# Patient Record
Sex: FEMALE | Race: WHITE | NOT HISPANIC OR LATINO | URBAN - METROPOLITAN AREA
[De-identification: names, ages, dates, MRNs, and addresses within clinical notes are randomized per-mention and may not be internally consistent; named-entity substitution may affect disease eponyms.]

---

## 2017-07-23 ENCOUNTER — COMMUNICATION - HEALTHEAST (OUTPATIENT)
Dept: INTERNAL MEDICINE | Facility: CLINIC | Age: 47
End: 2017-07-23

## 2017-09-05 ENCOUNTER — HOSPITAL ENCOUNTER (OUTPATIENT)
Dept: MAMMOGRAPHY | Facility: HOSPITAL | Age: 47
Discharge: HOME | End: 2017-09-05
Attending: INTERNAL MEDICINE

## 2017-09-05 DIAGNOSIS — Z12.31 VISIT FOR SCREENING MAMMOGRAM: ICD-10-CM

## 2017-09-25 ENCOUNTER — COMMUNICATION - HEALTHEAST (OUTPATIENT)
Dept: INTERNAL MEDICINE | Facility: CLINIC | Age: 47
End: 2017-09-25

## 2017-09-25 ENCOUNTER — OFFICE VISIT - HEALTHEAST (OUTPATIENT)
Dept: INTERNAL MEDICINE | Facility: CLINIC | Age: 47
End: 2017-09-25

## 2017-09-25 DIAGNOSIS — Z00.00 ROUTINE PHYSICAL EXAMINATION: ICD-10-CM

## 2017-09-25 DIAGNOSIS — E78.5 HLD (HYPERLIPIDEMIA): ICD-10-CM

## 2017-09-25 DIAGNOSIS — Z78.0 MENOPAUSE: ICD-10-CM

## 2017-09-25 DIAGNOSIS — J30.9 ALLERGIC RHINITIS: ICD-10-CM

## 2017-09-25 DIAGNOSIS — K21.9 GERD (GASTROESOPHAGEAL REFLUX DISEASE): ICD-10-CM

## 2017-09-25 DIAGNOSIS — I10 HTN (HYPERTENSION): ICD-10-CM

## 2017-09-25 DIAGNOSIS — L91.8 SKIN TAG: ICD-10-CM

## 2017-09-25 LAB
CHOLEST SERPL-MCNC: 205 MG/DL
FASTING STATUS PATIENT QL REPORTED: YES
HDLC SERPL-MCNC: 56 MG/DL
LDLC SERPL CALC-MCNC: 116 MG/DL
TRIGL SERPL-MCNC: 166 MG/DL

## 2017-09-25 ASSESSMENT — MIFFLIN-ST. JEOR: SCORE: 1381.98

## 2017-11-06 ENCOUNTER — COMMUNICATION - HEALTHEAST (OUTPATIENT)
Dept: INTERNAL MEDICINE | Facility: CLINIC | Age: 47
End: 2017-11-06

## 2017-11-06 DIAGNOSIS — E78.5 HLD (HYPERLIPIDEMIA): ICD-10-CM

## 2018-08-12 ENCOUNTER — OFFICE VISIT - HEALTHEAST (OUTPATIENT)
Dept: FAMILY MEDICINE | Facility: CLINIC | Age: 48
End: 2018-08-12

## 2018-08-12 DIAGNOSIS — S82.62XA CLOSED DISPLACED FRACTURE OF LATERAL MALLEOLUS OF LEFT FIBULA, INITIAL ENCOUNTER: ICD-10-CM

## 2018-08-12 DIAGNOSIS — M25.572 LEFT ANKLE PAIN: ICD-10-CM

## 2018-08-17 ENCOUNTER — RECORDS - HEALTHEAST (OUTPATIENT)
Dept: ADMINISTRATIVE | Facility: OTHER | Age: 48
End: 2018-08-17

## 2018-08-25 ENCOUNTER — COMMUNICATION - HEALTHEAST (OUTPATIENT)
Dept: INTERNAL MEDICINE | Facility: CLINIC | Age: 48
End: 2018-08-25

## 2018-08-31 ENCOUNTER — RECORDS - HEALTHEAST (OUTPATIENT)
Dept: ADMINISTRATIVE | Facility: OTHER | Age: 48
End: 2018-08-31

## 2018-09-24 ENCOUNTER — HOSPITAL ENCOUNTER (OUTPATIENT)
Dept: MAMMOGRAPHY | Facility: CLINIC | Age: 48
Discharge: HOME | End: 2018-09-24
Attending: INTERNAL MEDICINE

## 2018-09-24 DIAGNOSIS — Z12.31 VISIT FOR SCREENING MAMMOGRAM: ICD-10-CM

## 2018-11-07 ENCOUNTER — OFFICE VISIT - HEALTHEAST (OUTPATIENT)
Dept: FAMILY MEDICINE | Facility: CLINIC | Age: 48
End: 2018-11-07

## 2018-11-07 DIAGNOSIS — D72.829 LEUKOCYTOSIS: ICD-10-CM

## 2018-11-07 DIAGNOSIS — N17.9 ACUTE RENAL FAILURE (H): ICD-10-CM

## 2018-11-07 DIAGNOSIS — E87.6 HYPOKALEMIA: ICD-10-CM

## 2018-11-07 DIAGNOSIS — K52.9 GASTROENTERITIS: ICD-10-CM

## 2018-11-07 DIAGNOSIS — B37.0 ORAL THRUSH: ICD-10-CM

## 2018-11-07 LAB
ANION GAP SERPL CALCULATED.3IONS-SCNC: 15 MMOL/L (ref 5–18)
BASOPHILS # BLD AUTO: 0.1 THOU/UL (ref 0–0.2)
BASOPHILS NFR BLD AUTO: 0 % (ref 0–2)
BUN SERPL-MCNC: 38 MG/DL (ref 8–22)
CHLORIDE BLD-SCNC: 96 MMOL/L (ref 98–107)
CO2 SERPL-SCNC: 26 MMOL/L (ref 22–31)
CREAT SERPL-MCNC: 2.4 MG/DL (ref 0.7–1.3)
EOSINOPHIL # BLD AUTO: 0.2 THOU/UL (ref 0–0.4)
EOSINOPHIL NFR BLD AUTO: 1 % (ref 0–6)
ERYTHROCYTE [DISTWIDTH] IN BLOOD BY AUTOMATED COUNT: 13.6 % (ref 11–14.5)
GLUCOSE BLD-MCNC: 118 MG/DL (ref 70–125)
HCT VFR BLD AUTO: 36.4 % (ref 35–47)
HGB BLD-MCNC: 12.1 G/DL (ref 12–16)
LYMPHOCYTES # BLD AUTO: 0.8 THOU/UL (ref 0.8–4.4)
LYMPHOCYTES NFR BLD AUTO: 5 % (ref 20–40)
MCH RBC QN AUTO: 28.4 PG (ref 27–34)
MCHC RBC AUTO-ENTMCNC: 33.2 G/DL (ref 32–36)
MCV RBC AUTO: 85 FL (ref 80–100)
MONOCYTES # BLD AUTO: 1.4 THOU/UL (ref 0–0.9)
MONOCYTES NFR BLD AUTO: 8 % (ref 2–10)
NEUTROPHILS # BLD AUTO: 15.1 THOU/UL (ref 2–7.7)
NEUTROPHILS NFR BLD AUTO: 86 % (ref 50–70)
PLATELET # BLD AUTO: 381 THOU/UL (ref 140–440)
PMV BLD AUTO: 9.9 FL (ref 8.5–12.5)
POTASSIUM BLD-SCNC: 3.1 MMOL/L (ref 3.5–5.5)
RBC # BLD AUTO: 4.26 MILL/UL (ref 3.8–5.4)
SODIUM SERPL-SCNC: 137 MMOL/L (ref 136–145)
WBC: 17.7 THOU/UL (ref 4–11)

## 2018-11-07 ASSESSMENT — MIFFLIN-ST. JEOR
SCORE: 1371.55
SCORE: 1371.55

## 2018-11-08 ASSESSMENT — MIFFLIN-ST. JEOR
SCORE: 1400.12
SCORE: 1400.12

## 2018-11-09 ASSESSMENT — MIFFLIN-ST. JEOR
SCORE: 1384.25
SCORE: 1384.25

## 2018-11-13 ASSESSMENT — MIFFLIN-ST. JEOR
SCORE: 1399.67
SCORE: 1399.67

## 2018-11-14 ENCOUNTER — SURGERY - HEALTHEAST (OUTPATIENT)
Dept: GASTROENTEROLOGY | Facility: HOSPITAL | Age: 48
End: 2018-11-14

## 2018-11-15 ENCOUNTER — SURGERY - HEALTHEAST (OUTPATIENT)
Dept: GASTROENTEROLOGY | Facility: HOSPITAL | Age: 48
End: 2018-11-15

## 2018-11-15 ENCOUNTER — RECORDS - HEALTHEAST (OUTPATIENT)
Dept: ADMINISTRATIVE | Facility: OTHER | Age: 48
End: 2018-11-15

## 2018-11-15 ASSESSMENT — MIFFLIN-ST. JEOR
SCORE: 1401.03
SCORE: 1401.03

## 2018-11-18 ASSESSMENT — MIFFLIN-ST. JEOR
SCORE: 1405.57
SCORE: 1405.57

## 2018-11-19 ASSESSMENT — MIFFLIN-ST. JEOR
SCORE: 1397.4
SCORE: 1397.4

## 2018-11-20 ASSESSMENT — MIFFLIN-ST. JEOR
SCORE: 1394.68
SCORE: 1394.68

## 2018-11-22 ENCOUNTER — RECORDS - HEALTHEAST (OUTPATIENT)
Dept: ADMINISTRATIVE | Facility: OTHER | Age: 48
End: 2018-11-22

## 2018-11-26 ENCOUNTER — COMMUNICATION - HEALTHEAST (OUTPATIENT)
Dept: CARE COORDINATION | Facility: CLINIC | Age: 48
End: 2018-11-26

## 2018-11-27 ENCOUNTER — RECORDS - HEALTHEAST (OUTPATIENT)
Dept: ADMINISTRATIVE | Facility: OTHER | Age: 48
End: 2018-11-27

## 2018-11-30 ENCOUNTER — AMBULATORY - HEALTHEAST (OUTPATIENT)
Dept: INFUSION THERAPY | Facility: HOSPITAL | Age: 48
End: 2018-11-30

## 2018-11-30 DIAGNOSIS — I77.82 ANCA-ASSOCIATED VASCULITIS (H): ICD-10-CM

## 2018-12-04 ENCOUNTER — OFFICE VISIT - HEALTHEAST (OUTPATIENT)
Dept: INTERNAL MEDICINE | Facility: CLINIC | Age: 48
End: 2018-12-04

## 2018-12-04 DIAGNOSIS — Z09 HOSPITAL DISCHARGE FOLLOW-UP: ICD-10-CM

## 2018-12-04 DIAGNOSIS — E78.2 MIXED HYPERLIPIDEMIA: ICD-10-CM

## 2018-12-04 DIAGNOSIS — D64.9 ANEMIA, UNSPECIFIED TYPE: ICD-10-CM

## 2018-12-04 DIAGNOSIS — N17.0 ACUTE KIDNEY FAILURE WITH LESION OF TUBULAR NECROSIS (H): ICD-10-CM

## 2018-12-04 DIAGNOSIS — I77.82 ANCA-ASSOCIATED VASCULITIS (H): ICD-10-CM

## 2018-12-04 DIAGNOSIS — J30.9 ALLERGIC RHINITIS, UNSPECIFIED SEASONALITY, UNSPECIFIED TRIGGER: ICD-10-CM

## 2018-12-04 DIAGNOSIS — Z02.89 ENCOUNTER FOR COMPLETION OF FORM WITH PATIENT: ICD-10-CM

## 2018-12-04 DIAGNOSIS — E87.20 METABOLIC ACIDOSIS, NORMAL ANION GAP (NAG): ICD-10-CM

## 2018-12-04 DIAGNOSIS — I10 ESSENTIAL HYPERTENSION: ICD-10-CM

## 2018-12-04 DIAGNOSIS — M31.31 GRANULOMATOSIS WITH POLYANGIITIS WITH RENAL INVOLVEMENT (H): ICD-10-CM

## 2018-12-04 DIAGNOSIS — K52.9 ILEITIS: ICD-10-CM

## 2018-12-04 DIAGNOSIS — K92.1 MELENA: ICD-10-CM

## 2018-12-04 DIAGNOSIS — K21.9 GASTROESOPHAGEAL REFLUX DISEASE, ESOPHAGITIS PRESENCE NOT SPECIFIED: ICD-10-CM

## 2018-12-06 ENCOUNTER — INFUSION - HEALTHEAST (OUTPATIENT)
Dept: INFUSION THERAPY | Facility: CLINIC | Age: 48
End: 2018-12-06

## 2018-12-06 DIAGNOSIS — N17.0 ACUTE KIDNEY FAILURE WITH LESION OF TUBULAR NECROSIS (H): ICD-10-CM

## 2018-12-06 LAB
ALBUMIN SERPL-MCNC: 2.6 G/DL (ref 3.5–5)
ANION GAP SERPL CALCULATED.3IONS-SCNC: 13 MMOL/L (ref 5–18)
BASOPHILS # BLD AUTO: 0.1 THOU/UL (ref 0–0.2)
BASOPHILS NFR BLD AUTO: 1 % (ref 0–2)
BUN SERPL-MCNC: 124 MG/DL (ref 8–22)
CALCIUM SERPL-MCNC: 7 MG/DL (ref 8.5–10.5)
CHLORIDE BLD-SCNC: 105 MMOL/L (ref 98–107)
CO2 SERPL-SCNC: 26 MMOL/L (ref 22–31)
CREAT SERPL-MCNC: 6.87 MG/DL (ref 0.6–1.1)
EOSINOPHIL # BLD AUTO: 0.5 THOU/UL (ref 0–0.4)
EOSINOPHIL NFR BLD AUTO: 3 % (ref 0–6)
ERYTHROCYTE [DISTWIDTH] IN BLOOD BY AUTOMATED COUNT: 17.5 % (ref 11–14.5)
GFR SERPL CREATININE-BSD FRML MDRD: 6 ML/MIN/1.73M2
GLUCOSE BLD-MCNC: 112 MG/DL (ref 70–125)
HCT VFR BLD AUTO: 27.1 % (ref 35–47)
HGB BLD-MCNC: 8.6 G/DL (ref 12–16)
LYMPHOCYTES # BLD AUTO: 0.5 THOU/UL (ref 0.8–4.4)
LYMPHOCYTES NFR BLD AUTO: 4 % (ref 20–40)
MCH RBC QN AUTO: 28.2 PG (ref 27–34)
MCHC RBC AUTO-ENTMCNC: 31.7 G/DL (ref 32–36)
MCV RBC AUTO: 89 FL (ref 80–100)
MONOCYTES # BLD AUTO: 0.9 THOU/UL (ref 0–0.9)
MONOCYTES NFR BLD AUTO: 6 % (ref 2–10)
NEUTROPHILS # BLD AUTO: 12 THOU/UL (ref 2–7.7)
NEUTROPHILS NFR BLD AUTO: 87 % (ref 50–70)
PHOSPHATE SERPL-MCNC: 5 MG/DL (ref 2.5–4.5)
PLATELET # BLD AUTO: 200 THOU/UL (ref 140–440)
PMV BLD AUTO: 9.8 FL (ref 8.5–12.5)
POTASSIUM BLD-SCNC: 4.7 MMOL/L (ref 3.5–5)
RBC # BLD AUTO: 3.05 MILL/UL (ref 3.8–5.4)
SODIUM SERPL-SCNC: 144 MMOL/L (ref 136–145)
WBC: 14.2 THOU/UL (ref 4–11)

## 2018-12-11 ENCOUNTER — COMMUNICATION - HEALTHEAST (OUTPATIENT)
Dept: INTERNAL MEDICINE | Facility: CLINIC | Age: 48
End: 2018-12-11

## 2018-12-20 ENCOUNTER — HOSPITAL ENCOUNTER (OUTPATIENT)
Dept: ULTRASOUND IMAGING | Facility: HOSPITAL | Age: 48
Discharge: HOME | End: 2018-12-20
Attending: INTERNAL MEDICINE

## 2018-12-20 ENCOUNTER — INFUSION - HEALTHEAST (OUTPATIENT)
Dept: INFUSION THERAPY | Facility: HOSPITAL | Age: 48
End: 2018-12-20

## 2018-12-20 DIAGNOSIS — R60.0 LEG EDEMA, RIGHT: ICD-10-CM

## 2018-12-20 DIAGNOSIS — N17.0 ACUTE KIDNEY FAILURE WITH LESION OF TUBULAR NECROSIS (H): ICD-10-CM

## 2018-12-20 DIAGNOSIS — I77.82 ANCA-ASSOCIATED VASCULITIS (H): ICD-10-CM

## 2018-12-20 LAB
ALBUMIN SERPL-MCNC: 2.4 G/DL (ref 3.5–5)
ANION GAP SERPL CALCULATED.3IONS-SCNC: 16 MMOL/L (ref 5–18)
BASOPHILS # BLD AUTO: 0 THOU/UL (ref 0–0.2)
BASOPHILS NFR BLD AUTO: 0 % (ref 0–2)
BUN SERPL-MCNC: 90 MG/DL (ref 8–22)
CALCIUM SERPL-MCNC: 6.5 MG/DL (ref 8.5–10.5)
CHLORIDE BLD-SCNC: 102 MMOL/L (ref 98–107)
CO2 SERPL-SCNC: 27 MMOL/L (ref 22–31)
CREAT SERPL-MCNC: 4.54 MG/DL (ref 0.6–1.1)
EOSINOPHIL # BLD AUTO: 0.3 THOU/UL (ref 0–0.4)
EOSINOPHIL NFR BLD AUTO: 3 % (ref 0–6)
ERYTHROCYTE [DISTWIDTH] IN BLOOD BY AUTOMATED COUNT: 19.9 % (ref 11–14.5)
GFR SERPL CREATININE-BSD FRML MDRD: 10 ML/MIN/1.73M2
GLUCOSE BLD-MCNC: 106 MG/DL (ref 70–125)
HCT VFR BLD AUTO: 26.9 % (ref 35–47)
HGB BLD-MCNC: 8.5 G/DL (ref 12–16)
LYMPHOCYTES # BLD AUTO: 0.6 THOU/UL (ref 0.8–4.4)
LYMPHOCYTES NFR BLD AUTO: 7 % (ref 20–40)
MCH RBC QN AUTO: 28.5 PG (ref 27–34)
MCHC RBC AUTO-ENTMCNC: 31.6 G/DL (ref 32–36)
MCV RBC AUTO: 90 FL (ref 80–100)
MONOCYTES # BLD AUTO: 0.7 THOU/UL (ref 0–0.9)
MONOCYTES NFR BLD AUTO: 9 % (ref 2–10)
NEUTROPHILS # BLD AUTO: 6.4 THOU/UL (ref 2–7.7)
NEUTROPHILS NFR BLD AUTO: 80 % (ref 50–70)
PHOSPHATE SERPL-MCNC: 5.6 MG/DL (ref 2.5–4.5)
PLATELET # BLD AUTO: 202 THOU/UL (ref 140–440)
PMV BLD AUTO: 10.7 FL (ref 8.5–12.5)
POTASSIUM BLD-SCNC: 3.4 MMOL/L (ref 3.5–5)
RBC # BLD AUTO: 2.98 MILL/UL (ref 3.8–5.4)
SODIUM SERPL-SCNC: 145 MMOL/L (ref 136–145)
WBC: 8 THOU/UL (ref 4–11)

## 2018-12-27 ENCOUNTER — COMMUNICATION - HEALTHEAST (OUTPATIENT)
Dept: INTERNAL MEDICINE | Facility: CLINIC | Age: 48
End: 2018-12-27

## 2019-01-03 ENCOUNTER — INFUSION - HEALTHEAST (OUTPATIENT)
Dept: INFUSION THERAPY | Facility: HOSPITAL | Age: 49
End: 2019-01-03

## 2019-01-03 ENCOUNTER — OFFICE VISIT - HEALTHEAST (OUTPATIENT)
Dept: INTERNAL MEDICINE | Facility: CLINIC | Age: 49
End: 2019-01-03

## 2019-01-03 DIAGNOSIS — M25.561 ACUTE PAIN OF RIGHT KNEE: ICD-10-CM

## 2019-01-03 DIAGNOSIS — Z76.89 RETURN TO WORK EXAM: ICD-10-CM

## 2019-01-03 DIAGNOSIS — N17.0 ACUTE KIDNEY FAILURE WITH LESION OF TUBULAR NECROSIS (H): ICD-10-CM

## 2019-01-03 DIAGNOSIS — I10 ESSENTIAL HYPERTENSION: ICD-10-CM

## 2019-01-03 DIAGNOSIS — R60.0 BILATERAL LOWER EXTREMITY EDEMA: ICD-10-CM

## 2019-01-03 DIAGNOSIS — M31.31 GRANULOMATOSIS WITH POLYANGIITIS WITH RENAL INVOLVEMENT (H): ICD-10-CM

## 2019-01-03 DIAGNOSIS — M71.21 BAKER'S CYST OF KNEE, RIGHT: ICD-10-CM

## 2019-01-03 DIAGNOSIS — Z29.89 NEED FOR PNEUMOCYSTIS PROPHYLAXIS: ICD-10-CM

## 2019-01-03 LAB
ALBUMIN SERPL-MCNC: 2.5 G/DL (ref 3.5–5)
ANION GAP SERPL CALCULATED.3IONS-SCNC: 13 MMOL/L (ref 5–18)
BASOPHILS # BLD AUTO: 0 THOU/UL (ref 0–0.2)
BASOPHILS NFR BLD AUTO: 0 % (ref 0–2)
BUN SERPL-MCNC: 89 MG/DL (ref 8–22)
CALCIUM SERPL-MCNC: 7.7 MG/DL (ref 8.5–10.5)
CHLORIDE BLD-SCNC: 105 MMOL/L (ref 98–107)
CO2 SERPL-SCNC: 25 MMOL/L (ref 22–31)
CREAT SERPL-MCNC: 3.71 MG/DL (ref 0.6–1.1)
EOSINOPHIL # BLD AUTO: 0.1 THOU/UL (ref 0–0.4)
EOSINOPHIL NFR BLD AUTO: 1 % (ref 0–6)
ERYTHROCYTE [DISTWIDTH] IN BLOOD BY AUTOMATED COUNT: 22 % (ref 11–14.5)
GFR SERPL CREATININE-BSD FRML MDRD: 13 ML/MIN/1.73M2
GLUCOSE BLD-MCNC: 115 MG/DL (ref 70–125)
HCT VFR BLD AUTO: 29.1 % (ref 35–47)
HGB BLD-MCNC: 8.9 G/DL (ref 12–16)
LYMPHOCYTES # BLD AUTO: 1.1 THOU/UL (ref 0.8–4.4)
LYMPHOCYTES NFR BLD AUTO: 8 % (ref 20–40)
MCH RBC QN AUTO: 29 PG (ref 27–34)
MCHC RBC AUTO-ENTMCNC: 30.6 G/DL (ref 32–36)
MCV RBC AUTO: 95 FL (ref 80–100)
MONOCYTES # BLD AUTO: 0.7 THOU/UL (ref 0–0.9)
MONOCYTES NFR BLD AUTO: 6 % (ref 2–10)
NEUTROPHILS # BLD AUTO: 10.7 THOU/UL (ref 2–7.7)
NEUTROPHILS NFR BLD AUTO: 86 % (ref 50–70)
OVALOCYTES: ABNORMAL
PHOSPHATE SERPL-MCNC: 4.4 MG/DL (ref 2.5–4.5)
PLAT MORPH BLD: NORMAL
PLATELET # BLD AUTO: 236 THOU/UL (ref 140–440)
PMV BLD AUTO: 11.1 FL (ref 8.5–12.5)
POLYCHROMASIA BLD QL SMEAR: ABNORMAL
POTASSIUM BLD-SCNC: 3.7 MMOL/L (ref 3.5–5)
RBC # BLD AUTO: 3.07 MILL/UL (ref 3.8–5.4)
SODIUM SERPL-SCNC: 143 MMOL/L (ref 136–145)
WBC: 12.9 THOU/UL (ref 4–11)

## 2019-01-28 ENCOUNTER — RECORDS - HEALTHEAST (OUTPATIENT)
Dept: ADMINISTRATIVE | Facility: OTHER | Age: 49
End: 2019-01-28

## 2019-02-05 ENCOUNTER — RECORDS - HEALTHEAST (OUTPATIENT)
Dept: ADMINISTRATIVE | Facility: OTHER | Age: 49
End: 2019-02-05

## 2019-02-18 ENCOUNTER — RECORDS - HEALTHEAST (OUTPATIENT)
Dept: ADMINISTRATIVE | Facility: OTHER | Age: 49
End: 2019-02-18

## 2019-02-25 ENCOUNTER — RECORDS - HEALTHEAST (OUTPATIENT)
Dept: ADMINISTRATIVE | Facility: OTHER | Age: 49
End: 2019-02-25

## 2019-03-04 ENCOUNTER — OFFICE VISIT - HEALTHEAST (OUTPATIENT)
Dept: INTERNAL MEDICINE | Facility: CLINIC | Age: 49
End: 2019-03-04

## 2019-03-04 DIAGNOSIS — I10 ESSENTIAL HYPERTENSION: ICD-10-CM

## 2019-03-04 DIAGNOSIS — M31.31 GRANULOMATOSIS WITH POLYANGIITIS WITH RENAL INVOLVEMENT (H): ICD-10-CM

## 2019-03-04 DIAGNOSIS — J31.0 CHRONIC RHINITIS: ICD-10-CM

## 2019-03-04 DIAGNOSIS — R11.0 NAUSEA: ICD-10-CM

## 2019-03-04 DIAGNOSIS — K21.9 GASTROESOPHAGEAL REFLUX DISEASE, ESOPHAGITIS PRESENCE NOT SPECIFIED: ICD-10-CM

## 2019-03-04 DIAGNOSIS — N17.9 ACUTE KIDNEY INJURY (H): ICD-10-CM

## 2019-03-04 DIAGNOSIS — D63.1 ANEMIA OF RENAL DISEASE: ICD-10-CM

## 2019-03-04 DIAGNOSIS — N18.9 ANEMIA OF RENAL DISEASE: ICD-10-CM

## 2019-03-04 DIAGNOSIS — N17.0 ACUTE KIDNEY FAILURE WITH LESION OF TUBULAR NECROSIS (H): ICD-10-CM

## 2019-03-04 DIAGNOSIS — E78.2 MIXED HYPERLIPIDEMIA: ICD-10-CM

## 2019-03-04 RX ORDER — TORSEMIDE 20 MG/1
20 TABLET ORAL EVERY OTHER DAY
Status: SHIPPED
Start: 2019-03-04

## 2019-03-04 RX ORDER — CALCITRIOL 0.25 UG/1
0.25 CAPSULE, LIQUID FILLED ORAL DAILY
Refills: 11 | Status: SHIPPED
Start: 2019-03-04

## 2019-03-04 RX ORDER — AZELASTINE 1 MG/ML
1 SPRAY, METERED NASAL EVERY MORNING
Qty: 30 ML | Refills: 11 | Status: SHIPPED | OUTPATIENT
Start: 2019-03-04

## 2019-03-05 ENCOUNTER — RECORDS - HEALTHEAST (OUTPATIENT)
Dept: ADMINISTRATIVE | Facility: OTHER | Age: 49
End: 2019-03-05

## 2019-04-17 ENCOUNTER — RECORDS - HEALTHEAST (OUTPATIENT)
Dept: ADMINISTRATIVE | Facility: OTHER | Age: 49
End: 2019-04-17

## 2019-06-24 ENCOUNTER — RECORDS - HEALTHEAST (OUTPATIENT)
Dept: ADMINISTRATIVE | Facility: OTHER | Age: 49
End: 2019-06-24

## 2019-07-15 ENCOUNTER — OFFICE VISIT - HEALTHEAST (OUTPATIENT)
Dept: INTERNAL MEDICINE | Facility: CLINIC | Age: 49
End: 2019-07-15

## 2019-07-15 DIAGNOSIS — N18.9 ANEMIA OF RENAL DISEASE: ICD-10-CM

## 2019-07-15 DIAGNOSIS — R09.82 POST-NASAL DRIP: ICD-10-CM

## 2019-07-15 DIAGNOSIS — R11.0 NAUSEA: ICD-10-CM

## 2019-07-15 DIAGNOSIS — Z01.818 PRE-TRANSPLANT EVALUATION FOR CKD (CHRONIC KIDNEY DISEASE): ICD-10-CM

## 2019-07-15 DIAGNOSIS — D63.1 ANEMIA OF RENAL DISEASE: ICD-10-CM

## 2019-07-15 DIAGNOSIS — I10 ESSENTIAL HYPERTENSION: ICD-10-CM

## 2019-07-15 DIAGNOSIS — N18.5 CKD (CHRONIC KIDNEY DISEASE) STAGE 5, GFR LESS THAN 15 ML/MIN (H): ICD-10-CM

## 2019-07-15 DIAGNOSIS — M31.31 GRANULOMATOSIS WITH POLYANGIITIS WITH RENAL INVOLVEMENT (H): ICD-10-CM

## 2019-07-15 DIAGNOSIS — Z02.89 ENCOUNTER FOR COMPLETION OF FORM WITH PATIENT: ICD-10-CM

## 2019-07-15 RX ORDER — LOSARTAN POTASSIUM 50 MG/1
50 TABLET ORAL DAILY
Refills: 11 | Status: SHIPPED | COMMUNITY
Start: 2019-06-23

## 2019-07-16 RX ORDER — CYCLOPHOSPHAMIDE 25 MG/1
50 CAPSULE ORAL DAILY
Refills: 8 | Status: SHIPPED | OUTPATIENT
Start: 2019-07-16

## 2019-08-14 ENCOUNTER — AMBULATORY - HEALTHEAST (OUTPATIENT)
Dept: MULTI SPECIALTY CLINIC | Facility: CLINIC | Age: 49
End: 2019-08-14

## 2019-09-10 ENCOUNTER — AMBULATORY - HEALTHEAST (OUTPATIENT)
Dept: MULTI SPECIALTY CLINIC | Facility: CLINIC | Age: 49
End: 2019-09-10

## 2019-09-10 LAB
HPV_EXT - HISTORICAL: NORMAL
PAP SMEAR - HIM PATIENT REPORTED: NORMAL

## 2019-10-04 ENCOUNTER — RECORDS - HEALTHEAST (OUTPATIENT)
Dept: ADMINISTRATIVE | Facility: OTHER | Age: 49
End: 2019-10-04

## 2019-11-19 ENCOUNTER — COMMUNICATION - HEALTHEAST (OUTPATIENT)
Dept: INTERNAL MEDICINE | Facility: CLINIC | Age: 49
End: 2019-11-19

## 2020-04-28 ENCOUNTER — RECORDS - HEALTHEAST (OUTPATIENT)
Dept: ADMINISTRATIVE | Facility: OTHER | Age: 50
End: 2020-04-28

## 2020-05-07 ENCOUNTER — RECORDS - HEALTHEAST (OUTPATIENT)
Dept: ADMINISTRATIVE | Facility: OTHER | Age: 50
End: 2020-05-07

## 2020-07-03 ENCOUNTER — COMMUNICATION - HEALTHEAST (OUTPATIENT)
Dept: INTERNAL MEDICINE | Facility: CLINIC | Age: 50
End: 2020-07-03

## 2020-07-03 RX ORDER — MYCOPHENOLATE MOFETIL 250 MG/1
750 CAPSULE ORAL 2 TIMES DAILY
Status: SHIPPED | COMMUNITY
Start: 2020-05-14

## 2020-07-03 RX ORDER — LORATADINE 10 MG/1
10 TABLET ORAL DAILY
Status: SHIPPED | COMMUNITY
Start: 2020-05-14

## 2020-07-03 RX ORDER — CARVEDILOL 12.5 MG/1
12.5 TABLET ORAL 2 TIMES DAILY
Status: SHIPPED | COMMUNITY
Start: 2020-05-14

## 2020-07-03 RX ORDER — ATORVASTATIN CALCIUM 10 MG/1
20 TABLET, FILM COATED ORAL DAILY
Status: SHIPPED | COMMUNITY
Start: 2020-06-18

## 2020-07-03 RX ORDER — DAPSONE 25 MG/1
50 TABLET ORAL DAILY
Status: SHIPPED | COMMUNITY
Start: 2020-06-30

## 2020-07-03 RX ORDER — TACROLIMUS 1 MG/1
3 CAPSULE ORAL 2 TIMES DAILY
Status: SHIPPED | COMMUNITY
Start: 2020-06-01

## 2020-07-03 RX ORDER — FERROUS SULFATE 325(65) MG
325 TABLET ORAL 2 TIMES DAILY
Status: SHIPPED | COMMUNITY
Start: 2020-06-18

## 2020-07-03 RX ORDER — ACYCLOVIR 200 MG/1
400 CAPSULE ORAL 2 TIMES DAILY
Status: SHIPPED | COMMUNITY
Start: 2020-05-14

## 2020-07-03 RX ORDER — ASPIRIN 81 MG/1
81 TABLET, CHEWABLE ORAL DAILY
Status: SHIPPED | COMMUNITY
Start: 2020-05-15

## 2020-07-03 RX ORDER — PREDNISONE 5 MG/1
10 TABLET ORAL DAILY
Status: SHIPPED | COMMUNITY
Start: 2020-06-22

## 2020-07-03 RX ORDER — AMLODIPINE BESYLATE 5 MG/1
5 TABLET ORAL DAILY
Status: SHIPPED | COMMUNITY
Start: 2020-05-28

## 2021-05-27 VITALS — SYSTOLIC BLOOD PRESSURE: 132 MMHG | DIASTOLIC BLOOD PRESSURE: 83 MMHG

## 2021-05-30 NOTE — PROGRESS NOTES
Wt Readings from Last 20 Encounters:   07/15/19 150 lb (68 kg)   03/04/19 150 lb (68 kg)   01/03/19 169 lb (76.7 kg)   12/20/18 166 lb (75.3 kg)   12/04/18 168 lb (76.2 kg)   11/20/18 177 lb 6.4 oz (80.5 kg)   11/07/18 166 lb 3.2 oz (75.4 kg)   08/12/18 177 lb 4.8 oz (80.4 kg)   09/25/17 174 lb 9.6 oz (79.2 kg)   09/01/16 178 lb 3.2 oz (80.8 kg)   10/19/15 171 lb (77.6 kg)   09/08/15 172 lb (78 kg)   09/03/15 170 lb 6.4 oz (77.3 kg)   09/01/15 170 lb (77.1 kg)   12/29/14 177 lb (80.3 kg)

## 2021-05-30 NOTE — PATIENT INSTRUCTIONS - HE
Please follow up if you have any further issues.    You may contact me by phone or [employer] if you are worsening or if things are not improving.    Thank you for coming in today.

## 2021-05-31 VITALS — HEIGHT: 64 IN | WEIGHT: 174.6 LBS | BODY MASS INDEX: 29.81 KG/M2

## 2021-06-01 VITALS — WEIGHT: 177.3 LBS | BODY MASS INDEX: 30.43 KG/M2

## 2021-06-02 VITALS
WEIGHT: 177.4 LBS | HEIGHT: 64 IN | BODY MASS INDEX: 30.29 KG/M2 | BODY MASS INDEX: 30.29 KG/M2 | HEIGHT: 64 IN | WEIGHT: 177.4 LBS

## 2021-06-02 VITALS — WEIGHT: 169 LBS | BODY MASS INDEX: 29.01 KG/M2

## 2021-06-02 VITALS — BODY MASS INDEX: 25.75 KG/M2 | WEIGHT: 150 LBS

## 2021-06-02 VITALS — BODY MASS INDEX: 28.84 KG/M2 | WEIGHT: 168 LBS

## 2021-06-02 VITALS — WEIGHT: 166.2 LBS | BODY MASS INDEX: 28.53 KG/M2

## 2021-06-02 VITALS — WEIGHT: 166 LBS | BODY MASS INDEX: 28.49 KG/M2

## 2021-06-03 ENCOUNTER — RECORDS - HEALTHEAST (OUTPATIENT)
Dept: ADMINISTRATIVE | Facility: CLINIC | Age: 51
End: 2021-06-03

## 2021-06-03 VITALS — WEIGHT: 150 LBS | BODY MASS INDEX: 25.75 KG/M2

## 2021-06-09 NOTE — TELEPHONE ENCOUNTER
Outside blood pressure entered and in goal.    Recently had renal transplant from her .    Lauri Wei MD  General Internal Medicine  [facility]  7/3/2020, 11:57 PM

## 2021-06-16 PROBLEM — Z94.0 IMMUNOSUPPRESSIVE MANAGEMENT ENCOUNTER FOLLOWING KIDNEY TRANSPLANT: Status: ACTIVE | Noted: 2020-05-15

## 2021-06-16 PROBLEM — N18.5 ANEMIA OF CHRONIC KIDNEY FAILURE, STAGE 5 (H): Status: ACTIVE | Noted: 2020-07-03

## 2021-06-16 PROBLEM — M31.31 GRANULOMATOSIS WITH POLYANGIITIS WITH RENAL INVOLVEMENT (H): Status: ACTIVE | Noted: 2018-12-06

## 2021-06-16 PROBLEM — Z79.899 IMMUNOSUPPRESSIVE MANAGEMENT ENCOUNTER FOLLOWING KIDNEY TRANSPLANT: Status: ACTIVE | Noted: 2020-05-15

## 2021-06-16 PROBLEM — N18.9 ANEMIA OF RENAL DISEASE: Status: ACTIVE | Noted: 2018-11-07

## 2021-06-16 PROBLEM — Q21.12 PFO (PATENT FORAMEN OVALE): Status: ACTIVE | Noted: 2020-07-03

## 2021-06-16 PROBLEM — D63.1 ANEMIA OF RENAL DISEASE: Status: ACTIVE | Noted: 2018-11-07

## 2021-06-16 PROBLEM — N17.0 ACUTE KIDNEY FAILURE WITH LESION OF TUBULAR NECROSIS (H): Status: ACTIVE | Noted: 2018-12-03

## 2021-06-16 PROBLEM — D63.1 ANEMIA OF CHRONIC KIDNEY FAILURE, STAGE 5 (H): Status: ACTIVE | Noted: 2020-07-03

## 2021-06-16 PROBLEM — B34.8 BK VIREMIA: Status: ACTIVE | Noted: 2020-06-25

## 2021-06-16 PROBLEM — Z79.52 LONG TERM CURRENT USE OF SYSTEMIC STEROIDS: Status: ACTIVE | Noted: 2020-05-15

## 2021-06-16 PROBLEM — N18.5 CKD (CHRONIC KIDNEY DISEASE) STAGE 5, GFR LESS THAN 15 ML/MIN (H): Status: ACTIVE | Noted: 2019-07-16

## 2021-06-16 PROBLEM — Z94.0 KIDNEY TRANSPLANT STATUS, LIVING UNRELATED DONOR: Status: ACTIVE | Noted: 2020-05-12

## 2021-06-16 PROBLEM — N25.81 SECONDARY HYPERPARATHYROIDISM OF RENAL ORIGIN (H): Status: ACTIVE | Noted: 2020-05-12

## 2021-06-18 NOTE — LETTER
Letter by Lauri Wei MD at      Author: Lauri Wei MD Service: -- Author Type: --    Filed:  Encounter Date: 1/3/2019 Status: (Other)       1/3/2019    Kayla Rosales   [address]  [place] [address]         To whom it may concern:    I am sending you this letter in relationship to a patient of mine, Kayla Rosales ( 1970).    Kayla Rosales should continue on working 5 hours per day as tolerated and 3 days per week at this time.     On , she may return to full time work.  Keep in mind that for the next 6 weeks, she may occasionally need to leave early from work up to 1-2 times a week due to her illness.    If you have any questions regarding the above, feel free to contact me at [phone].         Sincerely,      Lauri Wei MD  General Internal Medicine  [facility]

## 2021-06-19 NOTE — PROGRESS NOTES
Assessment and Plan     Kayla was seen today for left foot injury yesterday morning while camping.    Diagnoses and all orders for this visit:    Closed displaced fracture of lateral malleolus of left fibula, initial encounter  -     Ankle/Foot DME: Walking Boot; Left; Pneumatic  -     Ambulatory referral to Orthopedics    Left ankle pain  -     XR Ankle Left 3 or More VWS         HPI     Chief Complaint   Patient presents with     left foot injury yesterday morning while camping     fell while camping and landed wrong.  Difficulty bearing weight.       Kayla Rosales is a 48 y.o. female seen today for left ankle pain.  Yesterday she was walking her dog on a hiking trail when he ran off into the brush pulling her along, she stepped on something wrong, twisted her left ankle and fell to the ground.  She was Ambicor immediately after the fall and has been amatory since.  She is complaining of pain in her left ankle, primarily on the outside of the ankle.  Denies foot pain.  Denies knee or leg pain.    Current Outpatient Prescriptions:      lisinopril-hydrochlorothiazide (PRINZIDE,ZESTORETIC) 10-12.5 mg per tablet, Take 1 tablet by mouth daily., Disp: 90 tablet, Rfl: 3     loratadine-pseudoephedrine (LORATADINE-PSEUDOEPHEDRINE) 5-120 mg Tb12, Take 1 tablet by mouth every 12 (twelve) hours., Disp: , Rfl: 0     MULTIVIT &MINERALS/FERROUS FUM (MULTI VITAMIN ORAL), Take by mouth., Disp: , Rfl:      omeprazole (PRILOSEC) 20 MG capsule, Take 1 capsule (20 mg total) by mouth daily., Disp: , Rfl:      simvastatin (ZOCOR) 20 MG tablet, TAKE 1 TABLET BY MOUTH AT BEDTIME, Disp: 90 tablet, Rfl: 3     azelastine (ASTELIN) 137 mcg (0.1 %) nasal spray, 1 spray into each nostril 2 (two) times a day. Use in each nostril as directed, Disp: 30 mL, Rfl: 11     simvastatin (ZOCOR) 20 MG tablet, TAKE 1 TABLET BY MOUTH AT BEDTIME, Disp: 90 tablet, Rfl: 0     Reviewed and updated: medical history, medications and allergies.     Review  of Systems     General: Denies fever, chills, fatigue.  MSK: Left ankle pain.     Objective     Vitals:    08/12/18 1608   BP: 118/64   Patient Site: Right Arm   Patient Position: Sitting   Cuff Size: Adult Regular   Pulse: (!) 110   Resp: 20   Temp: 98.9  F (37.2  C)   TempSrc: Oral   SpO2: 97%   Weight: 177 lb 4.8 oz (80.4 kg)        Reviewed vital signs.  General: Appears calm, comfortable. Answers questions quickly and appropriately with clear speech. No apparent distress.  Skin: Pink, warm, dry.  HENT: Normocephalic, atraumatic.  Neck: Supple.  Cardiovascular: Strong, regular radial pulse.  Respiratory: Normal respiratory effort.  Neuro: Memory and cognition appear normal. Normal gait.  Psych: Mood and affect appear normal.   MSK: Left ankle is swollen.  There is no medial bony or ligamentous tenderness.  There is tenderness over the distal portion of the lateral views.  No midfoot or forefoot tenderness.  No anterior joint line pain or tenderness.  The ankle is stable to inversion and anterior drawer test.  There is significant pain with inversion.    Imaging:   X-ray ankle left: Fracture of the distal fibula below the level of the mortise.  No other fracture or dislocation noted.  This is my personal read.    Radiology read:  Xr Ankle Left 3 Or More Vws    Result Date: 8/12/2018  EXAM DATE:         08/12/2018 [facility] X-RAY ANKLE LEFT, MINIMUM THREE VIEWS 8/12/2018 4:15 PM INDICATION: ankle trauma, lateral tenderness COMPARISON: None. FINDINGS: There is a transverse fracture through the distal fibula the fracture is approximately 1.5 cm proximal to the distal end of the fibula. There is minimal lateral displacement of the distal fragment by approximately 1.5 mm. No other fractures are identified. No dislocation. There are plantar and posterior calcaneal spurs. NOTE: ABNORMAL REPORT THE DICTATION ABOVE DESCRIBES AN ABNORMALITY FOR WHICH FOLLOW-UP IS NEEDED.       Labs:  No results found for  this or any previous visit (from the past 24 hour(s)).     Medical Decision-Making     Kayla is a generally well-appearing 48-year-old female presents 1 day after twisting her ankle while walking her dog.  She has been ambulatory since.  Her appearance is nontoxic.  She is not tachycardic with tachypnea, or febrile.  Physical exam is concerning for significant swelling of the ankle and bony tenderness at the tip of the lateral malleolus.  No other exam findings concerning for fracture in the foot or lower leg.  X-ray showed a fracture of the distal fibula beyond the level of the mortise.  Placed in a short walking boot.  She will follow-up with orthopedics in 7-10 days.    Reviewed red flags that would trigger a prompt return to the clinic as noted below under patient instructions.  She expressed understanding of these directions and is in agreement with the plan.     Patient Instructions     Patient Instructions   It is okay to bear weight occasionally as tolerated.    Keep your foot elevated when seated.    Use Tylenol 1000 mg every 6 hours as your primary pain control.  Use ibuprofen 400 mg as needed for worse pain.    Follow up with orthopedics in 7 - 10 days.    If your toes become blue or numb, immediately remove the boot and go to the urgent care or ED.      Discussed benefit vs risk of medications, dosing, side effects.  Patient was able to verbalize understanding.  After visit summary was provided for patient.     Mars Moser PA-C

## 2021-06-21 NOTE — PROGRESS NOTES
"TCM DISCHARGE FOLLOW UP CALL    Discharge Date:  11/21/2018  Reason for hospital stay (discharge diagnosis)::  IleitisMelena, Anemia, GI bleed, ANCA-associated vasculitis, Acute kidney injury, Hypomagnesemia  Are you feeling better, the same or worse since your discharge?:  Patient is feeling the same (No fevers, bleeding, abdominal pain.)  Do you feel like you have a plan in the event of a health emergency?: Yes (\"Go to ER, call nephrology office.\"  RN informed pt of 24/7 nurse triage.)    As part of your discharge plan, were  home care services ordered for you?: No    Did you receive any new medications, or was there a change to your medications?: Yes    Are you taking those medications, or do you have any established regiment?:  RN reviewed new/changed d/c medications w/pt from d/c paperwork.  Pt states she is taking medications as prescribed, and confirmed having stopped lisinopril-HCTZ, as instructed.  Do you have any follow up visits scheduled with your PCP or Specialist?:  Yes, with PCP and Yes, with Specialist (Dr. Wei 12/4/18)  (RN) Is PCP appt scheduled soon enough (within 14 days of discharge date)?: Yes    Who are you seeing and when is it scheduled?:  Saw Dr. Moreno (nephrologist) today, and will have f/u call tomorrow w/plan going forward on chemo restart.      Wendy Stanley RN Care Manager, Population Health        "

## 2021-06-21 NOTE — PROGRESS NOTES
Subjective:      Patient ID: Kayla Rosales is a 48 y.o. female.    Chief Complaint:   Chief Complaint   Patient presents with     Fever     started last week off and on,      Fatigue     not eaten in 4 days due to nausea and lack of appetite.  Is drinking   Spot on leg - questioning Lymes ( was camping 3 weeks ago)         HPI : Started with fever/chills which went away.  Started feeling sick approximately 1 week ago.  Has not been able to tolerate eating for the last 4 days.  Decreased fluid intake.  Decreased urination.    Rare vomiting.  Semi-formed brown stools.  Having diarrhea 10 times per day.      Intermittent abdominal pain/cramping that comes and goes in association with eating or drinking with dull, constant pain in between attempts to eat/drink.    Worried about Lymes, went to [place] 3 weeks ago.  Has wound on left ankle that is not going away.  Was wearing a brace due to broken ankle at the time.      Tongue felt swollen the last few days.  Does not use steroid inhalers, but uses Flonase.    History of seasonal allergies which are worse because she has not been able to take her pills.  Continues to take Flonase.      Patient has not taken her blood pressure pill in 2 days.    Past Medical History:   Diagnosis Date     Allergic rhinitis      GERD (gastroesophageal reflux disease)      HLD (hyperlipidemia)      HTN (hypertension)      Nonsmoker      Plantar fasciitis        Past Surgical History:   Procedure Laterality Date     CHOLECYSTECTOMY  09/01/2016     ESSURE TUBAL LIGATION         Family History   Problem Relation Age of Onset     Breast cancer Cousin 41     Hyperlipidemia Mother      Hypertension Mother      Hyperlipidemia Father      Hypertension Father      Arthritis Brother      Hyperlipidemia Brother        Social History   Substance Use Topics     Smoking status: Never Smoker     Smokeless tobacco: Never Used     Alcohol use No       Review of Systems   Constitutional: Positive for  chills, fatigue and fever.   HENT: Positive for congestion and sore throat (off and on).    Respiratory: Negative for cough.    Gastrointestinal: Positive for abdominal pain (Comes and goes if eating or drinking ), diarrhea (x 10 in the last 24 hours.  ), nausea (starting 1 week ago ) and vomiting (x 2 last 24 hours ). Negative for blood in stool.   Allergic/Immunologic: Positive for environmental allergies (hasn't been taking due to vomiting).       Objective:     /68 (Patient Site: Right Arm, Patient Position: Sitting, Cuff Size: Adult Regular)  Pulse (!) 113  Temp 99.6  F (37.6  C)  Resp 20  Wt 166 lb 3.2 oz (75.4 kg)  LMP 07/25/2016 (Approximate)  SpO2 98%  BMI 28.53 kg/m2    Physical Exam   Constitutional: She is oriented to person, place, and time. She appears well-developed and well-nourished.   HENT:   Right Ear: Tympanic membrane and external ear normal.   Left Ear: Tympanic membrane and external ear normal.   Mouth/Throat: Posterior oropharyngeal erythema present.   Patchy white plaque on tongue.     Eyes: Right conjunctiva is injected. Left conjunctiva is injected.   Cardiovascular: Normal rate, normal heart sounds and intact distal pulses.    Manual recheck of heart rate 108 by me.   Pulmonary/Chest: Effort normal and breath sounds normal.   Abdominal: Soft. Bowel sounds are normal. She exhibits no distension and no mass. There is tenderness (Worse suprapubic.  Tender also right lower quadrant, left lower quadrant.). There is no rebound.   Musculoskeletal:   Normal gait    Neurological: She is alert and oriented to person, place, and time.   Skin: Skin is warm and dry.   Scabbed over open area left lower leg above ankle -not consistent with Lyme's disease.   Psychiatric: She has a normal mood and affect. Her behavior is normal. Judgment and thought content normal.     Recent Results (from the past 24 hour(s))   ISTAT CHEM 7 (HE CLINIC ONLY)   Result Value Ref Range    Sodium 137 136 - 145  mmol/L    Potassium 3.1 (L) 3.5 - 5.5 mmol/L    CO2 26 22 - 31 mmol/L    Chloride 96 (L) 98 - 107 mmol/L    Anion Gap, Calculation 15 5 - 18 mmol/L    Glucose 118 70 - 125 mg/dL    BUN 38 (H) 8 - 22 mg/dL    Creatinine 2.40 (H) 0.70 - 1.30 mg/dL     No results found.    Preliminary CBC shows a white blood cell count of 17.3 neutrophils 84% hemoglobin 12.4 - tube sent to [facility] lab for confirmatory CBC.      Assessment:     Procedures    The primary encounter diagnosis was Gastroenteritis. Diagnoses of Acute renal failure (H), Hypokalemia, Leukocytosis, and Oral thrush were also pertinent to this visit.    Plan:     Diagnoses and all orders for this visit:    Gastroenteritis  -     HM1(CBC and Differential)  -     ISTAT CHEM 7 (HE CLINIC ONLY)  -     HM1 (CBC with Diff)    Acute renal failure (H)    Hypokalemia    Leukocytosis    Oral thrush    Other orders  -     Discontinue: ondansetron disintegrating tablet 4 mg (ZOFRAN-ODT); Take 1 tablet (4 mg total) by mouth once.      Differential includes viral gastroenteritis with fluid volume depletion, infectious colitis, diverticulitis.  Patient will need inpatient care, workup for etiology of abdominal pain and diarrhea    Patient with a leukocytosis, abdominal pain, tachycardia, low-grade fever, acute renal failure, thrush vs. Severe tongue dryness,and hypokalemia.  Will need admission to the hospital for rehydration and further workup.    [facility], who recommends patient be seen in the emergency room to further evaluate and start IV fluids as soon as possible.    Discussed case with Dr. Fuchs at the [facility] emergency department.  Patient's  will drive her to the emergency department directly from our clinic.  Advised nothing to eat or drink pending evaluation emergency department.

## 2021-06-22 NOTE — PROGRESS NOTES
RENAL PROGRESS NOTE      Subjective:   Reviewed chart.   Pt new to me.   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   Here for 2nd dose of cytoxan.   Saw Dr Moreno last week in clinic.   Feels generally slowly better.   Food still not tasting good but no other uremic sx other than fatigue. Sinus sx decreased.  Wt and L leg edema decreased but progressive swelling and pain in R leg conor back of calf and back of knee and into thigh. No trauma, no hx of trauma to leg. No f/c.  Hx of DVT L leg with ankle fracture this past fall.   No shortness of breath no cp no palpitations  No mouth sores.   No f/c or other infx sx  No dysuria  No n/v/d    Took one dose of bactrim and nearly immediately got hot and red and rash returned so stopped again.     Objective:                Vital signs in last 24 hrs;  Temp:  [98.1  F (36.7  C)] 98.1  F (36.7  C)  Heart Rate:  [86] 86  BP: (148)/(93) 148/93  SpO2:  [98 %] 98 %    Physical Exam:   WD WN   HEENT ATNC o/p clear  Neck no jvd or LA  Lungs clear posteriorly   Cor RRR normal s1s2  abd soft nt  Ext severe R leg edema from ankle to upper thigh, L leg with 1+ edema and much smaller, pant leg tight on R.  Feet warm and well perfused  Skin no rash   Neuro non focal       Current Labs;  Infusion on 12/20/2018   Component Date Value Ref Range Status     Albumin 12/20/2018 2.4* 3.5 - 5.0 g/dL Final     Calcium 12/20/2018 6.5* 8.5 - 10.5 mg/dL Final     Phosphorus 12/20/2018 5.6* 2.5 - 4.5 mg/dL Final     Glucose 12/20/2018 106  70 - 125 mg/dL Final     BUN 12/20/2018 90* 8 - 22 mg/dL Final     Creatinine 12/20/2018 4.54* 0.60 - 1.10 mg/dL Final     Sodium 12/20/2018 145  136 - 145 mmol/L Final     Potassium 12/20/2018 3.4* 3.5 - 5.0 mmol/L Final     Chloride 12/20/2018 102  98 - 107 mmol/L Final     CO2 12/20/2018 27  22 - 31 mmol/L Final     Anion Gap, Calculation 12/20/2018 16  5 - 18 mmol/L Final     GFR MDRD Af Amer 12/20/2018 12* >60 mL/min/1.73m2 Final      GFR MDRD Non Af Amer 12/20/2018 10* >60 mL/min/1.73m2 Final     WBC 12/20/2018 8.0  4.0 - 11.0 thou/uL Final     RBC 12/20/2018 2.98* 3.80 - 5.40 mill/uL Final     Hemoglobin 12/20/2018 8.5* 12.0 - 16.0 g/dL Final     Hematocrit 12/20/2018 26.9* 35.0 - 47.0 % Final     MCV 12/20/2018 90  80 - 100 fL Final     MCH 12/20/2018 28.5  27.0 - 34.0 pg Final     MCHC 12/20/2018 31.6* 32.0 - 36.0 g/dL Final     RDW 12/20/2018 19.9* 11.0 - 14.5 % Final     Platelets 12/20/2018 202  140 - 440 thou/uL Final     MPV 12/20/2018 10.7  8.5 - 12.5 fL Final     Neutrophils % 12/20/2018 80* 50 - 70 % Final     Lymphocytes % 12/20/2018 7* 20 - 40 % Final     Monocytes % 12/20/2018 9  2 - 10 % Final     Eosinophils % 12/20/2018 3  0 - 6 % Final     Basophils % 12/20/2018 0  0 - 2 % Final     Neutrophils Absolute 12/20/2018 6.4  2.0 - 7.7 thou/uL Final     Lymphocytes Absolute 12/20/2018 0.6* 0.8 - 4.4 thou/uL Final     Monocytes Absolute 12/20/2018 0.7  0.0 - 0.9 thou/uL Final     Eosinophils Absolute 12/20/2018 0.3  0.0 - 0.4 thou/uL Final     Basophils Absolute 12/20/2018 0.0  0.0 - 0.2 thou/uL Final          Assessment:       ANCA +GN and upper resp dz. Now on IS, dose number 2 of cytoxan today. Remains on prednisone 60mg. Tolerating IS, lymphocytes decreased but over WBC ok. Getting 500mg/m2 cytoxan today per Dr Moreno    LUCILA better! Nice drop in creat from 6-->4 and hopeful of more recovery as bx very acute. No need for dialysis now.     Edema improved overall and rec cut torsemide to 1/2 tab per day and inc K foods in diet and gave KCL 30 meq today     Anemia better s/p epo in our office last week and f/u planned    HTN mild     Low Ca s/p recent inc calcitriol and taking tums at noc, rec inc latter to 2    Prophylaxis, recurrent rash with retrial of bactrim so will start dapsone. Also on PPI    R leg swelling very concerning for DVT. Check U/S today and if +, needs a/c. Will f/u later today.    O/w f/u in infusion and our  office per Dr Moreno's plan.           12/20/2018 2:11 PM  Cee Toth MD  [facility]  [phone]

## 2021-06-22 NOTE — PROGRESS NOTES
Pt admitted per pedclara for her cytoxin and labs. Orders to draw labs and notify MD with results prior to infusion of cytoxin. Results called and spoke with Dr Moreno who consented the cytoxin.  requests her to start back on the bactrim twice weekly and to stop if she develops a rash and notify him. Pt informed of this request. Pt states she will let us know with the chemo if she has any side effects.

## 2021-06-22 NOTE — PATIENT INSTRUCTIONS - HE
February 5th appt to see Dr. Moreno. Report any new symptoms to your doctor. Have good fluid intake to help prevent bladder issues from the Cytoxan. Seek medical help as needed. Report any blood in your urine immediately.      Patient Education   Cyclophosphamide Solution for injection  What is this medicine?  CYCLOPHOSPHAMIDE (sye kloe SOUMYA fa mide) is a chemotherapy drug. It slows the growth of cancer cells. This medicine is used to treat many types of cancer like lymphoma, myeloma, leukemia, breast cancer, and ovarian cancer, to name a few.  This medicine may be used for other purposes; ask your health care provider or pharmacist if you have questions.  What should I tell my health care provider before I take this medicine?  They need to know if you have any of these conditions:    blood disorders    history of other chemotherapy    infection    kidney disease    liver disease    recent or ongoing radiation therapy    tumors in the bone marrow    an unusual or allergic reaction to cyclophosphamide, other chemotherapy, other medicines, foods, dyes, or preservatives    pregnant or trying to get pregnant    breast-feeding  How should I use this medicine?  This drug is usually given as an injection into a vein or muscle or by infusion into a vein. It is administered in a hospital or clinic by a specially trained health care professional.  Talk to your pediatrician regarding the use of this medicine in children. Special care may be needed.  Overdosage: If you think you have taken too much of this medicine contact a poison control center or emergency room at once.  NOTE: This medicine is only for you. Do not share this medicine with others.  What if I miss a dose?  It is important not to miss your dose. Call your doctor or health care professional if you are unable to keep an appointment.  What may interact with this medicine?  This medicine may interact with the following medications:    amiodarone    amphotericin  B    azathioprine    certain antiviral medicines for HIV or AIDS such as protease inhibitors (e.g., indinavir, ritonavir) and zidovudine    certain blood pressure medications such as benazepril, captopril, enalapril, fosinopril, lisinopril, moexipril, monopril, perindopril, quinapril, ramipril, trandolapril    certain cancer medications such as anthracyclines (e.g., daunorubicin, doxorubicin), busulfan, cytarabine, paclitaxel, pentostatin, tamoxifen, trastuzumab    certain diuretics such as chlorothiazide, chlorthalidone, hydrochlorothiazide, indapamide, metolazone    certain medicines that treat or prevent blood clots like warfarin    certain muscle relaxants such as succinylcholine    cyclosporine    etanercept    indomethacin    medicines to increase blood counts like filgrastim, pegfilgrastim, sargramostim    medicines used as general anesthesia    metronidazole    natalizumab  This list may not describe all possible interactions. Give your health care provider a list of all the medicines, herbs, non-prescription drugs, or dietary supplements you use. Also tell them if you smoke, drink alcohol, or use illegal drugs. Some items may interact with your medicine.  What should I watch for while using this medicine?  Visit your doctor for checks on your progress. This drug may make you feel generally unwell. This is not uncommon, as chemotherapy can affect healthy cells as well as cancer cells. Report any side effects. Continue your course of treatment even though you feel ill unless your doctor tells you to stop.  Drink water or other fluids as directed. Urinate often, even at night.  In some cases, you may be given additional medicines to help with side effects. Follow all directions for their use.  Call your doctor or health care professional for advice if you get a fever, chills or sore throat, or other symptoms of a cold or flu. Do not treat yourself. This drug decreases your body's ability to fight infections.  Try to avoid being around people who are sick.  This medicine may increase your risk to bruise or bleed. Call your doctor or health care professional if you notice any unusual bleeding.  Be careful brushing and flossing your teeth or using a toothpick because you may get an infection or bleed more easily. If you have any dental work done, tell your dentist you are receiving this medicine.  You may get drowsy or dizzy. Do not drive, use machinery, or do anything that needs mental alertness until you know how this medicine affects you.  Do not become pregnant while taking this medicine or for 1 year after stopping it. Women should inform their doctor if they wish to become pregnant or think they might be pregnant. Men should not father a child while taking thismedicine and for 4 months after stopping it. There is a potential for serious side effects to an unborn child. Talk to your health care professional or pharmacist for more information. Do not breast-feed an infant while taking this medicine.  This medicine may interfere with the ability to have a child. This medicine has caused ovarian failure in some women. This medicine has caused reduced sperm counts in some men. You should talk with your doctor or health care professional if you are concerned about your fertility.  If you are going to have surgery, tell your doctor or health care professional that you have taken this medicine.  What side effects may I notice from receiving this medicine?  Side effects that you should report to your doctor or health care professional as soon as possible:    allergic reactions like skin rash, itching or hives, swelling of the face, lips, or tongue    low blood counts - this medicine may decrease the number of white blood cells, red blood cells and platelets. You may be at increased risk for infections and bleeding.    signs of infection - fever or chills, cough, sore throat, pain or difficulty passing urine    signs of  decreased platelets or bleeding - bruising, pinpoint red spots on the skin, black, tarry stools, blood in the urine    signs of decreased red blood cells - unusually weak or tired, fainting spells, lightheadedness    breathing problems    dark urine    dizziness    palpitations    swelling of the ankles, feet, hands    trouble passing urine or change in the amount of urine    weight gain    yellowing of the eyes or skin  Side effects that usually do not require medical attention (report to your doctor or health care professional if they continue or are bothersome):    changes in nail or skin color    hair loss    missed menstrual periods    mouth sores    nausea, vomiting  This list may not describe all possible side effects. Call your doctor for medical advice about side effects. You may report side effects to FDA at 5-797-FDA-7155.  Where should I keep my medicine?  This drug is given in a hospital or clinic and will not be stored at home.  NOTE:This sheet is a summary. It may not cover all possible information. If you have questions about this medicine, talk to your doctor, pharmacist, or health care provider. Copyright  2015 [employer]

## 2021-06-22 NOTE — PROGRESS NOTES
Kayla Rosales arrived for third and final dose of Cytoxan infusion. She denies any fever or symptoms of infection. Labs drawn and results were reviewed as required. Call placed to clinic of  at 1112 for him to review today's lab results. Dr. Bernardo arrived shortly after. He came to see Kayla and today's lab results were reviewed, see his progress note. Will proceed with today's Cytoxan infusion. Kayla Rosales was educated on her plan of care. Each medication given today was reviewed prior to administration. Cytoxan 450 mg IV infusion treatment was completed as ordered with no signs of reaction. IV site:peripheral IV patent throughout treatment with positive blood return obtained before and at completion. IV site with no pain, redness, swelling and drainage. IV site flushed with saline and discontinued. Kayla Rosales has follow-up appointment scheduled with [facility] in February. Kayla Rosales was discharged to home. She discharged from unit ambulatory and independent at 1339. The after visit summary was given.    Darlene Garces

## 2021-06-22 NOTE — PROGRESS NOTES
Kayla Rosales arrived for second dose of every 2 weeks Cytoxan infusion. Kayla reports she has not noted any side effects from the Cytoxan except for a decrease in her appetite. She has swelling in her legs,right is more pronounced than her left. Labs drawn and results were reviewed. Potasium 3.4 today. Lab results were called to Dr. Toth at 1141. Ok to proceed with Cytoxan. Oral potassium ordered. Kayla was given Potassium 30 meq orally x one as per order from Dr. Toth.  Kayla Rosales was educated on her plan of care. Each medication given today was reviewed prior to administration. No premedications were given. Cytoxan 450 mg IV infusion treatment was completed in 35 minutes  with no signs of reaction. IV site:peripheral IV patent throughout treatment with positive blood return obtained before and at completion. IV site with no pain, redness, swelling and drainage. IV site flushed with saline and discontinued. Kayla Rosales has follow-up appointment scheduled on 01/03/2019 for Cytoxan. Dr. Toth stopped to see Kayla re her lab results and the swelling in her legs and leg cramps. Venous doppler ordered.  Kayla Rosales was discharged to radiology department for venous doppler study right leg. She discharged from unit per wheelchair at 1435. The after visit summary was given.     Darlene Garces

## 2021-06-22 NOTE — PATIENT INSTRUCTIONS - HE
Future Appointments   Date Time Provider Department Center   3/4/2019  9:40 AM [facility]        Please follow up if you have any further issues.    Thank you for coming in today.

## 2021-06-22 NOTE — PROGRESS NOTES
Nephrology Progress Note    Cc: ANCA + vasculitis, HTN, anemia, LUCILA    Assessment/Plan:    ANCA +GN and upper resp dz. Creatinine around 7 on initial diagnosis.  now on IS, dose number 3 of cytoxan today. Remains on prednisone 50mg and tapering 10 mg every 2 weeks.  Due to reduce her dose again next week.  Tolerating Cytoxan with minimal nausea.  No evidence for infection on full review of systems.  Counts reviewed and will proceed with an intravenous dose of Cytoxan today.  Plan on transitioning to oral Cytoxan next month.     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  Creatinine improving with the reading of 3.71 today.  No issues with uremia on full review.  No changes in urination, hematuria or dysuria.  Chemistries well controlled.  Hopefully will continue to improve with ongoing immunosuppression.     Edema improved overall and decreased torsemide to 1/2 tab per day.  Swelling continues to improve.  No respiratory issues.  Potassium normal with no dietary potassium restriction at this point.  Continue present diuretics.      Anemia better s/p epo in our office last week.  Receiving an injection every 2 weeks and due again next week.  Goal greater than 10-11.     HTN mild.  Remains asymptomatic.  Hold ACE inhibitor/ARB for now until she is stable on oral Cytoxan.     Low Ca s/p recent inc calcitriol and taking tums at night.  Corrected calcium now normal.     Prophylaxis, recurrent rash with retrial of bactrim so switched to dapsone.  Tolerating reasonably well.  Also on PPI     R leg swelling -no DVT but has a Baker's cyst.  Some discomfort in the posterior right leg.  No further intervention necessary at this time.      Plan:  Intravenous Cytoxan 500 mg today.  Decrease prednisone to 40 mg a day starting next week.  Decrease by 10 mg every 2 weeks according to her taper.  Continue prophylaxis with dapsone and PPI while on modestly high prednisone  Continue current diuretic  Epogen every 2 weeks in  clinic  Discussed Zofran as needed if she becomes more symptomatic after intravenous Cytoxan today.  Declines a prescription currently.  Follow-up in early February with Dr. Moreno to convert to oral Cytoxan.      Active Problems:    * No active hospital problems. *      Subjective  Feels well.  Appetite improving.  No nausea or vomiting.  Denies itching, muscle cramps, fasciculations, hiccups or other overt uremic symptoms.  No difficulty with urination, hematuria or dysuria.  Denies fevers, shaking chills, cough, nasal congestion or drainage.  No diarrhea, melena or bright red blood.  Has tolerated prior Cytoxan with slightly decreased appetite and mild nausea but no emesis.  Epogen given last week in clinic.    Objective    Vital signs in last 24 hours  [unfilled]  Weight:     Physical Exam  Pleasant woman in no acute distress sitting in the chair.  CHEST -clear to auscultation posteriorly with no inspiratory crackles or expiratory wheezes  CV -RRR, normal S1-S2, no S3 or rub  ABD -positive bowel sounds, soft, nontender  EXT -1+ lower extremity edema bilaterally.  Skin-no excoriation, urticaria or rash  Neuro-alert, oriented x3, cranial nerves II through XII grossly intact.  Moving extremities equally.    Pertinent Labs   Lab Results: personally reviewed.   Lab Results   Component Value Date     01/03/2019     12/20/2018     12/06/2018    K 3.7 01/03/2019    K 3.4 (L) 12/20/2018    K 4.7 12/06/2018    CO2 25 01/03/2019    CO2 27 12/20/2018    CO2 26 12/06/2018    BUN 89 (H) 01/03/2019    BUN 90 (H) 12/20/2018     (H) 12/06/2018    CREATININE 3.71 (H) 01/03/2019    CREATININE 4.54 (H) 12/20/2018    CREATININE 6.87 (HH) 12/06/2018    CALCIUM 7.7 (L) 01/03/2019    CALCIUM 6.5 (L) 12/20/2018    CALCIUM 7.0 (L) 12/06/2018     Lab Results   Component Value Date    WBC 12.9 (H) 01/03/2019    WBC 12.9 (H) 09/01/2015    HGB 8.9 (L) 01/03/2019    HCT 29.1 (L) 01/03/2019    MCV 95 01/03/2019    PLT  236 01/03/2019       Pertinent Radiology   Radiology Results: Reviewed available reports     Jagdeep Bernardo  [facility]  [phone]

## 2021-06-24 NOTE — PATIENT INSTRUCTIONS - HE
Please follow up if you have any further issues.    You may contact me by phone or [employer] if you are worsening or if things are not improving.    Thank you for coming in today.

## 2021-06-25 NOTE — PROGRESS NOTES
Progress Notes by Lauri Wei MD at 9/25/2017  8:50 AM     Author: Lauri Wei MD Service: -- Author Type: Physician    Filed: 9/25/2017 12:51 PM Encounter Date: 9/25/2017 Status: Signed    : Lauri Wei MD (Physician)              [facility]    Comprehensive and complex medical care - Chronic disease management - Shared decision making - Care coordination - Compassionate care    Patient advocacy - Rational deprescribing - Minimally disruptive medicine - Ethical focus - Customized care    Date of Service: 9/25/2017  Primary Provider: Lauri Wei MD    Patient Care Team:  Lauri Wei MD as PCP - General (Internal Medicine)     ______________________________________________________________________     Patient's Pharmacy:    [facility] [id number] - [address] AT [facility] & [address] [address]  Phone: [phone] Fax: [phone]     Patient's Insurance:    Payor: MEDICA / Plan: MEDICA / Product Type: PPO/POS/FFS /     ______________________________________________________________________    Routine physical - female, age 27-49.    Kayla Rosales is a 47 y.o. female coming in today for a routine physical.  She does have other issues outside the physical to discuss as well.    Past Medical History:   Diagnosis Date   ? Allergic rhinitis    ? GERD (gastroesophageal reflux disease)    ? HLD (hyperlipidemia)    ? HTN (hypertension)    ? Nonsmoker    ? Plantar fasciitis      Social History     Social History   ? Marital status:      Spouse name: N/A   ? Number of children: 0   ? Years of education: N/A     Occupational History   ? Lab/Chromosome analysis [employer]     Social History Main Topics   ? Smoking status: Never Smoker   ? Smokeless tobacco: None   ? Alcohol use No   ? Drug use: No   ? Sexual activity: Not Asked     Other Topics Concern    ? None     Social History Narrative       Family History   Problem Relation Age of Onset   ? Breast cancer Cousin 41   ? Hyperlipidemia Mother    ? Hypertension Mother    ? Hyperlipidemia Father    ? Hypertension Father    ? Arthritis Brother    ? Hyperlipidemia Brother      Family history is otherwise noncontributory.    Current Outpatient Prescriptions   Medication Sig   ? loratadine-pseudoephedrine (LORATADINE-PSEUDOEPHEDRINE) 5-120 mg Tb12 Take 1 tablet by mouth every 12 (twelve) hours.   ? MULTIVIT &MINERALS/FERROUS FUM (MULTI VITAMIN ORAL) Take by mouth.   ? azelastine (ASTELIN) 137 mcg (0.1 %) nasal spray 1 spray into each nostril 2 (two) times a day. Use in each nostril as directed   ? lisinopril-hydrochlorothiazide (PRINZIDE,ZESTORETIC) 10-12.5 mg per tablet Take 1 tablet by mouth daily.   ? omeprazole (PRILOSEC) 20 MG capsule Take 1 capsule (20 mg total) by mouth daily.   ? simvastatin (ZOCOR) 20 MG tablet TAKE 1 TABLET BY MOUTH AT BEDTIME     Immunization History   Administered Date(s) Administered   ? Influenza, inj, historic 09/30/2014   ? Td, Adult, Absorbed 03/01/2003   ? Tdap 11/04/2013      ______________________________________________________________________     History of present illness:    Patient comes in today for routine physical and for other issues.    Reviewed her hypertension today.  Blood pressure has been in the goal range.  Denies any excessive dizziness from the medication with this.     Reviewed hyperlipidemia and cholesterol is doing well.  Tolerating the medication without significant muscle symptoms.  We reviewed going off of the medication, but the patient is comfortable with staying with it at this point.    We reviewed her allergic rhinitis and things are stable here.  Her reflux is also doing well.    She has a skin tag over the top of her right foot.  This was treated previously but incompletely.  She would like to have this frozen again.  We reviewed possibly scooping it  "off, but she would like to try freezing it again first.    10 point review of systems are per the health history form.  She is menopausal now as she has not had a period for a year.  ______________________________________________________________________     Exam:    Wt Readings from Last 3 Encounters:   09/25/17 174 lb 9.6 oz (79.2 kg)   09/01/16 178 lb 3.2 oz (80.8 kg)   10/19/15 171 lb (77.6 kg)     BP Readings from Last 3 Encounters:   09/25/17 124/78   09/01/16 118/86   10/19/15 130/88     /78  Pulse 75  Ht 5' 4\" (1.626 m)  Wt 174 lb 9.6 oz (79.2 kg)  LMP 07/25/2016 (Approximate)  SpO2 97%  BMI 29.97 kg/m2  The patient is in no acute distress.  Mood good.  Insight good.  No skin lesions or nodules of concern.  Ears are clear.  Nose is clear.  Throat is clear.  Neck is supple  and there is no thyromegaly. No cervical, epitrochlear or axillary adenopathy.  Heart regular rate and rhythm.  No murmur present.  Lungs clear to auscultation bilaterally.  Respiratory effort is good.  Breast exam shows no nodules and no skin changes.  Abdomen is soft, nontender.  No hepatosplenomegaly.  No hernia appreciated.  Extremities show no edema.  Neuro exam shows normal strength in all muscle groups and normal reflexes.  There is a skin tag over the dorsum of the right foot which is approximately 6  millimeters in size.    ______________________________________________________________________    Diagnostics:    Results for orders placed or performed in visit on 09/01/16   Basic Metabolic Panel   Result Value Ref Range    Sodium 142 136 - 145 mmol/L    Potassium 3.7 3.5 - 5.0 mmol/L    Chloride 103 98 - 107 mmol/L    CO2 28 22 - 31 mmol/L    Anion Gap, Calculation 11 5 - 18 mmol/L    Glucose 63 (L) 70 - 125 mg/dL    Calcium 9.4 8.5 - 10.5 mg/dL    BUN 15 8 - 22 mg/dL    Creatinine 0.75 0.60 - 1.10 mg/dL    GFR MDRD Af Amer >60 >60 mL/min/1.73m2    GFR MDRD Non Af Amer >60 >60 mL/min/1.73m2   Lipid Cascade RANDOM "   Result Value Ref Range    Cholesterol 217 (H) <=199 mg/dL    Triglycerides 266 (H) <=149 mg/dL    HDL Cholesterol 53 >=50 mg/dL    LDL Calculated 111 <=129 mg/dL    Patient Fasting > 8hrs? Unknown    Gynecologic Cytology (PAP Smear)   Result Value Ref Range    Case Report       Gynecologic Cytology Report                       Case: [id number]                                   Authorizing Provider:  Lauri Wei MD        Collected:           09/01/2016 1652              Ordering Location:     [facility]         Received:            09/01/2016 1652                                     Medicine                                                                     First Screen:          LILY Hoff (ASCP)                                                     Specimen:    SUREPATH PAP, SCREENING, Endocervical/cervical                                             Interpretation       Negative for squamous intraepithelial lesion or malignancy    Result Flag Normal Normal    Specimen Adequacy       Satisfactory for evaluation, endocervical/transformation zone component present    HPV Reflex? Yes regardless of result     HIGH RISK No     LMP/Menopause Date 7-5-16     Abnormal Bleeding No     Pt Status Perimenopausal     Birth Control/Hormones None     Previous Normal/Date 3 year ago     Prev Abn Date/Dx NO     Cervical Appearance NL    HPV Cascade (PCR)   Result Value Ref Range    Interpretation No HPV Type(s) Detected No HPV Type(s) Detected, No High Risk HPV Type(s) Detected, DNA Quantity Not Sufficient     Denny Masters MD, [employer]       ______________________________________________________________________    Assessment:    1. Routine physical examination    2. HTN (hypertension)    3. HLD (hyperlipidemia)    4. Skin tag    5. GERD (gastroesophageal reflux disease)    6. Allergic rhinitis    7. Menopause-age 46        ______________________________________________________________________    ______________________________________________________________________    QUALITY REVIEW      Health Maintenance Due   Topic Date Due   ? INFLUENZA VACCINE RULE BASED (1) 08/01/2017       History   Smoking Status   ? Never Smoker   Smokeless Tobacco   ? Not on file        PHQ-2 Total Score: 0 (9/25/2017  8:00 AM)  No Data Recorded     ______________________________________________________________________       Plan:    1. Blood work done today.  2. Refilled medications for 1 year.  3. Skin tag treated today using liquid nitrogen in a freeze thaw freeze technique.  4. Reviewed colonoscopy which she will likely do in the future.  This would need to be ordered around April of next year and she was notified of this.  Otherwise, schedule at the time of her physical next year.    Lauri Wei MD  General Internal Medicine  [facility]    Return in about 1 year (around 9/25/2018), or if symptoms worsen or fail to improve.

## 2021-06-26 NOTE — PROGRESS NOTES
Progress Notes by Lauri Wei MD at 12/4/2018  2:40 PM     Author: Lauri Wei MD Service: -- Author Type: Physician    Filed: 12/9/2018  7:48 PM Encounter Date: 12/4/2018 Status: Signed    : Lauri Wei MD (Physician)              [facility]    Comprehensive and complex medical care - Chronic disease management - Shared decision making - Care coordination - Compassionate care    Patient advocacy - Rational deprescribing - Minimally disruptive medicine - Ethical focus - Customized care    ______________________________________________________________________     Date of Service: 12/4/2018  Primary Provider: Lauri Wei MD    Patient Care Team:  Lauri Wei MD as PCP - General (Internal Medicine)     ______________________________________________________________________     Patient's Pharmacy:    [id number] - [place], [place] - [facility] [address] AT [facility] [address]  [address] [facility] [address]  Phone: [phone] Fax: [phone]    [facility] - [address] [address]  Phone: [phone] Fax: [phone]     Patient's Contacts:  Name Home Phone Work Phone Mobile Phone Relationship Lgl KIARRA Kendrick   [phone] Spouse      Patient's Insurance:    Payor: MEDICA / Plan: MEDICA / Product Type: PPO/POS/FFS /     ______________________________________________________________________     Kayla Rosales is 48 y.o. female who comes in today for:     Chief Complaint   Patient presents with   ? Hospital Visit Follow Up     AUTO IMMUNE DISORDER   ? Form   ? OTHER     WHERE HAD IV STILL IS RED AND WILL GET A WHITE SPOT       Patient Active Problem List   Diagnosis   ? Allergic rhinitis   ? HLD (hyperlipidemia)   ? HTN (hypertension)   ? GERD (gastroesophageal reflux disease)   ? Nonsmoker   ? Ileitis    ? Metabolic acidosis, normal anion gap (NAG)   ? Melena   ? Anemia   ? ANCA-associated vasculitis (H)   ? Acute kidney failure with lesion of tubular necrosis (H)   ? 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 Allergic rhinitis    ? GERD (gastroesophageal reflux disease)    ? GI bleed 11/7/2018   ? HLD (hyperlipidemia)    ? HTN (hypertension)    ? Nonsmoker    ? Plantar fasciitis    ? Sleep apnea     possible sleep apnea      Current Outpatient Medications   Medication Sig   ? acetaminophen (TYLENOL) 500 MG tablet Take 2 tablets (1,000 mg total) by mouth once as needed for fever (chills, and/or rigors.).   ? amLODIPine (NORVASC) 5 MG tablet Take 1 tablet (5 mg total) by mouth daily.   ? azelastine (ASTELIN) 137 mcg (0.1 %) nasal spray Apply 1 spray into each nostril every morning. Use in each nostril as directed   ? calcitriol (ROCALTROL) 0.25 MCG capsule Take 0.25 mcg by mouth 3 (three) times a week.   ? calcium acetate (PHOSLO) 667 mg capsule Take 1 capsule (667 mg total) by mouth 3 (three) times a day with meals.   ? carvedilol (COREG) 6.25 MG tablet Take 1 tablet (6.25 mg total) by mouth 2 (two) times a day.   ? multivitamin therapeutic tablet Take 1 tablet by mouth daily.   ? omeprazole (PRILOSEC) 20 MG capsule Take 1 capsule (20 mg total) by mouth daily.   ? predniSONE (DELTASONE) 20 MG tablet Take 60 mg by mouth daily with breakfast.   ? sodium bicarbonate 650 MG tablet Take 2 tablets (1,300 mg total) by mouth 3 (three) times a day OTC product.   ? torsemide (DEMADEX) 20 MG tablet Take 2 tablets (40 mg total) by mouth daily.     Social History     Socioeconomic History   ? Marital status:      Spouse name: None   ? Number of children: 0   ? Years of education: None   ? Highest education level: None   Social Needs   ? Financial resource strain: None   ? Food insecurity - worry: None   ? Food insecurity - inability: None   ? Transportation  needs - medical: None   ? Transportation needs - non-medical: None   Occupational History   ? Occupation: Lab/Chromosome analysis     Employer: [employer]   Tobacco Use   ? Smoking status: Never Smoker   ? Smokeless tobacco: Never Used   Substance and Sexual Activity   ? Alcohol use: Yes     Alcohol/week: 1.2 oz     Types: 2 Glasses of wine per week   ? Drug use: No   ? Sexual activity: None   Other Topics Concern   ? None   Social History Narrative   ? None     Family History   Problem Relation Age of Onset   ? Breast cancer Cousin 41   ? Hyperlipidemia Mother    ? Hypertension Mother    ? Hyperlipidemia Father    ? Hypertension Father    ? Arthritis Brother    ? Hyperlipidemia Brother       Family history is otherwise noncontributory.    ______________________________________________________________________     History of present illness:    Patient comes in today for a hospital follow up visit.    We reviewed her hospital stay and the records from her visit were reviewed today.  I personally reviewed the lab tests, radiology and medical tests pertinent to that stay.     Patient comes in today for follow-up of her hospital stay.    She has subsequently seen a nephrologist since her hospital stay as well.    Received records from them.    We reviewed her polyangiitis with granulomatosis.  This came on fairly suddenly prior to her admission.  She broke her ankle in August and felt that this may have set things up.  She knows her ears plugging up.  She noticed feeling more poorly.  I had seen her earlier on and had done renal function on her which was normal at the time.  She says that when she last saw me, she had the symptoms.  She started to get a fever.  She went into the hospital.  She had acute renal dysfunction and worsening renal function.  She subsequently was found to have polyangiitis with granulomatosis based on a positive MT 3 antibody and a positive renal biopsy.  She was given her  first dose of cyclophosphamide in the hospital.  She tolerated it well.  She is continued to be followed by Dr. DOUGHERTY.  She should continue to follow with him on an ongoing basis for managing this issue and she feels really comfortable with his level of expertise.    She has had some eye issues off and on with red eye in the last month to 2 months.  We talked about possibly seeing ophthalmology for this.    She did have a lot of fluid in her during her hospital stay.  This is improving.  Her breathing is improved.    She is on a number of medications for her renal dysfunction.  These were reviewed with her today.    She had a rash after her hospital stay which was possibly due to the prophylactic Septra.  She is off of this at this time.    She does have paperwork to be filled out for her disability and her 's FMLA.    We reviewed her other issues noted in the assessment but not specifically addressed in the HPI above.     The 14-system review of systems form for [facility] was completed by patient, reviewed by me, and pertinent problems are reviewed above.  Specifically, she notes fatigue, sleeping difficulties, tingling in her feet, swelling in her legs (improved), diarrhea 2 times a day and a little bit of joint swelling.  ______________________________________________________________________     Exam:    Wt Readings from Last 3 Encounters:   12/04/18 168 lb (76.2 kg)   11/20/18 177 lb 6.4 oz (80.5 kg)   11/07/18 166 lb 3.2 oz (75.4 kg)     BP Readings from Last 3 Encounters:   12/06/18 161/82   12/04/18 132/70   11/21/18 146/83      /70 (Patient Site: Right Arm, Patient Position: Sitting, Cuff Size: Adult Regular)   Pulse 84   Wt 168 lb (76.2 kg)   LMP 07/25/2016 (Approximate)   SpO2 98%   BMI 28.84 kg/m    The patient is comfortable, no acute distress.  Mood good.  Insight is good.  No skin lesions or nodules of concern.  Ears clear.  Eyes are  nonicteric.  Pupils equal and reactive.  Throat is clear.  Neck is supple without mass, no thyromegaly.  Carotids are clear.  No cervical or epitrochlear adenopathy.  Heart regular rate and rhythm.  Lungs clear to auscultation bilaterally.  Respiratory effort good.  Abdomen soft and nontender.  No hepatosplenomegaly.  Extremities show 1+ edema..  Iritis or uveitis on exam today.  No effusions noted especially of her knees.  There is no skin vasculitis noted.    ______________________________________________________________________    Diagnostics:    Lab Results   Component Value Date    WBC 14.2 (H) 12/06/2018    HGB 8.6 (L) 12/06/2018    HCT 27.1 (L) 12/06/2018     12/06/2018    CHOL 205 (H) 09/25/2017    TRIG 166 (H) 09/25/2017    HDL 56 09/25/2017    ALT 49 (H) 11/17/2018    AST 15 11/17/2018     12/06/2018    K 4.7 12/06/2018     12/06/2018    CREATININE 6.87 (HH) 12/06/2018     (H) 12/06/2018    CO2 26 12/06/2018    TSH 2.97 08/01/2011    INR 1.26 (H) 11/15/2018       ______________________________________________________________________    Pertinent radiology for this visit includes the following:    Image Guided Kidney Biopsy    Result Date: 11/13/2018  1. PERCUTANEOUS BIOPSY OF THE LEFT KIDNEY 2. CT GUIDANCE 3. CONSCIOUS SEDATION 11/13/2018 2:40 PM INDICATION: Renal failure TECHNIQUE: Dose reduction techniques were used. PROCEDURE: Informed consent obtained. Time out performed. The site was prepped and draped in sterile fashion. 10 mL of 1% lidocaine was infused into the local soft tissues. Using standard technique and under direct CT guidance, an 18-gauge biopsy device was used to obtain three core biopsies. Tissue was submitted to Pathology and was adequate by preliminary review by a pathologist. The patient tolerated the procedure well. No immediate complications. RADIOLOGIC SUPERVISION AND INTERPRETATION: CT GUIDANCE: Images demonstrate the biopsy needle to be in good position.  SEDATION: Versed 2 mg. Fentanyl 100 mcg. The procedure was performed with administration intravenous conscious sedation with appropriate preoperative, intraoperative, and postoperative evaluation. 18 minutes of supervised face to face conscious sedation time was provided by a radiology nurse under my direct supervision.     CONCLUSION: Successful CT-guided biopsy of the left kidney. Reference CPT Code: 71843, 28530, 84414, 31477    Ct Abdomen Pelvis Without Oral Without Iv Contrast    Result Date: 11/7/2018  CT ABDOMEN PELVIS WO ORAL WO IV CONTRAST 11/7/2018 1:37 PM INDICATION: Abdominal pain and diarrhea TECHNIQUE: Routine CT abdomen and pelvis without oral or IV contrast. Multiplanar reformation images (MPR). Dose reduction techniques were used. COMPARISON: None. FINDINGS: LUNG BASES: Negative. ABDOMEN: Nonspecific ileitis involving the terminal 30 cm of the ileum poorly characterized without use of IV contrast. Nothing for abscesses. Consider infectious ileitis versus inflammatory bowel disease. Normal appendix. No significant findings in the liver, spleen, kidneys, pancreas, or adrenal glands. No abscesses. PELVIS: Negative. MUSCULOSKELETAL: Negative.     CONCLUSION: 1.  Nonspecific ileitis involving the terminal 30 cm of the ileum poorly characterized without use of IV contrast. Differential would include infectious enteritis versus Crohn's disease. 2.  No obstructions, or abscesses. NOTE: ABNORMAL REPORT THE DICTATION ABOVE DESCRIBES AN ABNORMALITY FOR WHICH FOLLOW-UP IS NEEDED.     Xr Chest 1 View Portable    Result Date: 11/12/2018  XR CHEST 1 VIEW PORTABLE 11/12/2018 1:51 AM INDICATION: Tachycardia COMPARISON: None. FINDINGS: Heart is normal in size. Thoracic aorta is tortuous. No pneumothorax. Trace left effusion. Minimal atelectatic stranding right midlung. Bony thorax unremarkable.    Us Kidney Bilateral    Result Date: 11/9/2018  US KIDNEY BILATERAL WITH BLADDER 11/9/2018 3:17 PM INDICATION: Eval for  obstruction, has otto TECHNIQUE: Routine kidneys and bladder. COMPARISON: CT abdomen and pelvis 11/07/2018 FINDINGS: Right kidney measures 13.0 x 6.0 x 5.8 cm. No hydronephrosis. No mass. Normal renal echotexture. Left kidney measures 13.3 x 6.2 x 6.4 cm. No hydronephrosis. No mass. Normal renal echotexture. Bladder is normal.     CONCLUSION: 1.  Normal ultrasound of kidneys. No evidence of hydronephrosis.    Us Venous Leg Left    Result Date: 11/15/2018  US VENOUS LEG LEFT 11/15/2018 4:52 PM INDICATION: Follow-up DVT TECHNIQUE: Routine exam without and with compression, augmentation, and duplex utilizing 2D gray-scale imaging, Doppler interrogation with color-flow and spectral waveform analysis. COMPARISON: 11/11/2018 FINDINGS: The common femoral, femoral, popliteal, and segmentally visualized calf veins were evaluated. The opposite CFV was also included in the evaluation. Left leg veins are negative for deep venous thrombosis. No popliteal cysts.     CONCLUSION: 1.  Left leg veins are negative for DVT with resolution of calf thrombus on prior.    Us Venous Leg Left    Result Date: 11/11/2018  US VENOUS LEG LEFT 11/11/2018 10:57 AM INDICATION: Edema TECHNIQUE: Routine exam without and with compression, augmentation, and duplex utilizing 2D gray-scale imaging, Doppler interrogation with color-flow and spectral waveform analysis. COMPARISON: None. FINDINGS: The common femoral, femoral, popliteal, and segmentally visualized calf veins were evaluated. The opposite CFV was also included in the evaluation. Left leg veins are positive for occlusive thrombus in the left gastrocnemius veins in the proximal calf. No other deep venous thrombosis identified no thrombus above the knee.. No popliteal cysts.     CONCLUSION: 1.  Left leg veins are positive for deep venous thrombosis in the gastrocnemius veins of the proximal calf. No thrombus is seen above the knee. Findings were called to the patient's nurse, Angel, at 1100 hours on  11/11/2018. NOTE: ABNORMAL REPORT THE DICTATION ABOVE DESCRIBES AN ABNORMALITY FOR WHICH FOLLOW-UP IS NEEDED.       ______________________________________________________________________ ______________________________________________________________________     Assessment:    1. 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 Acute kidney failure with lesion of tubular necrosis (H)    3. Hospital discharge follow-up    4. Anemia, unspecified type    5. Metabolic acidosis, normal anion gap (NAG)    6. Ileitis    7. Melena    8. ANCA-associated vasculitis (H)    9. Mixed hyperlipidemia    10. Gastroesophageal reflux disease, esophagitis presence not specified    11. Allergic rhinitis, unspecified seasonality, unspecified trigger    12. Essential hypertension    13. Encounter for completion of form with patient       ______________________________________________________________________     PHQ-2 Total Score: 0 (12/4/2018  2:00 PM)  Depression Follow-up Plan: patient follow-up to return when and if necessary (8/12/2018  4:06 PM)    No Data Recorded      Plan:    1. Appreciate the care rendered by Dr. Moreno.  2. Apparently she is having some issues with getting coverage for her cyclophosphamide infusion.  Hopefully, this can be resolved expeditiously.  3. I have encouraged her to be seen by ophthalmology for her intermittent eye issues to rule out ophthalmologic involvement of her eyes related to the vasculitis.  4. We did paperwork for her short-term disability and her 's FMLA today.  This took a good amount of time to take care of.  5. She is to follow-up if she is worsening in the meantime.  I will see her back in January.  6. She will continue her current steroid dosing and follow-up the instruction of nephrology.  I have received his records and this helps with coordinating her care.    Medication reconciliation was performed as a part of this visit as well.     40 minutes or greater were  spent with the patient today with greater than 50% of the times spent in counseling and coordination of care.       Lauri Wei MD  General Internal Medicine  [facility]     Personal office fax - [phone]  Voice mail - [phone]  E-mail - [email or web address]    Return in about 1 month (around 1/4/2019) for follow up visit.    Future Appointments   Date Time Provider Department Center   12/20/2018 10:00 AM JN INFUSION CHAIR 12 JN INFTX JN INF   1/3/2019 10:00 AM JN INFUSION CHAIR 9 JN INFTX JN INF   1/3/2019  2:05 PM [facility]        ______________________________________________________________________     Relevant ICD-10 codes and order associations:      ICD-10-CM    1. 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 Acute kidney failure with lesion of tubular necrosis (H) N17.0 DISCONTINUED: cyclophosphamide 450 mg in sodium chloride 0.9% 250 mL chemo (CYTOXAN)   3. Hospital discharge follow-up Z09    4. Anemia, unspecified type D64.9    5. Metabolic acidosis, normal anion gap (NAG) E87.2    6. Ileitis K52.9    7. Melena K92.1    8. ANCA-associated vasculitis (H) I77.6    9. Mixed hyperlipidemia E78.2    10. Gastroesophageal reflux disease, esophagitis presence not specified K21.9    11. Allergic rhinitis, unspecified seasonality, unspecified trigger J30.9    12. Essential hypertension I10    13. Encounter for completion of form with patient Z02.89

## 2021-06-27 ENCOUNTER — HEALTH MAINTENANCE LETTER (OUTPATIENT)
Age: 51
End: 2021-06-27

## 2021-06-27 NOTE — PROGRESS NOTES
Progress Notes by Lauri Wei MD at 1/3/2019  2:05 PM     Author: Lauri Wei MD Service: -- Author Type: Physician    Filed: 1/3/2019  9:45 PM Encounter Date: 1/3/2019 Status: Signed    : Lauri Wei MD (Physician)              [facility]    Comprehensive and complex medical care - Chronic disease management - Shared decision making - Care coordination - Compassionate care    Patient advocacy - Rational deprescribing - Minimally disruptive medicine - Ethical focus - Customized care    ______________________________________________________________________     Date of Service: 1/3/2019  Primary Provider: Lauri Wei MD    Patient Care Team:  Lauri Wei MD as PCP - General (Internal Medicine)  Benigno Moreno MD as Physician (Nephrology)     ______________________________________________________________________     Patient's Pharmacy:    [id number] - [place] [address] AT [facility] [address]  [address]  [facility] [address]  Phone: [phone] Fax: [phone]    [facility] - [address] [address]  Phone: [phone] Fax: [phone]     Patient's Contacts:  Name Home Phone Work Phone Mobile Phone Relationship Lgl Megacatina   KIARRA ROSALES   [phone] Spouse      Patient's Insurance:    Payor: MEDICA / Plan: MEDICA / Product Type: PPO/POS/FFS /     ______________________________________________________________________     Kayla Rosales is 48 y.o. female who comes in today for:    Chief Complaint   Patient presents with   ? Follow-up     knees are doing better       Active Problem List:  Problem List as of 1/3/2019 Reviewed: 1/2/2019  9:59 AM by Lauri Wei MD       High    Nonsmoker    Granulomatosis with polyangiitis with renal involvement (H)       Medium    HTN (hypertension)        Low    Allergic rhinitis    GERD (gastroesophageal reflux disease)    HLD (hyperlipidemia)       Unprioritized    Acute kidney failure with lesion of tubular necrosis (H)    ANCA-associated vasculitis (H)    Anemia    Ileitis    Melena    Metabolic acidosis, normal anion gap (NAG)           Current Outpatient Medications   Medication Sig   ? acetaminophen (TYLENOL) 500 MG tablet Take 2 tablets (1,000 mg total) by mouth once as needed for fever (chills, and/or rigors.).   ? amLODIPine (NORVASC) 5 MG tablet Take 1 tablet (5 mg total) by mouth daily.   ? azelastine (ASTELIN) 137 mcg (0.1 %) nasal spray Apply 1 spray into each nostril every morning. Use in each nostril as directed   ? calcitriol (ROCALTROL) 0.25 MCG capsule Take 0.25 mcg by mouth 3 (three) times a week.   ? calcium acetate (PHOSLO) 667 mg capsule Take 1 capsule (667 mg total) by mouth 3 (three) times a day with meals.   ? carvedilol (COREG) 6.25 MG tablet Take 1 tablet (6.25 mg total) by mouth 2 (two) times a day.   ? dapsone 100 MG tablet Take 1 tablet (100 mg total) by mouth daily.   ? multivitamin therapeutic tablet Take 1 tablet by mouth daily.   ? omeprazole (PRILOSEC) 20 MG capsule Take 1 capsule (20 mg total) by mouth daily.   ? predniSONE (DELTASONE) 20 MG tablet Take 60 mg by mouth daily with breakfast. (Patient taking differently: Take 50 mg by mouth daily with breakfast 50 mg daily started on 12/30/2018.)   ? torsemide (DEMADEX) 20 MG tablet Take 2 tablets (40 mg total) by mouth daily.     Social History     Social History Narrative   ? Not on file     ______________________________________________________________________     History of present illness:    Patient comes in today for follow-up.    We first reviewed her polyangiitis.  She just completed her third course of cyclophosphamide.  She is taking 50 mg of prednisone and will be taking 40 mg in 2 weeks.  It sounds like the plan is to transition her to oral Cytoxan next.  Her renal  function is improving.  She feels well overall.  She has had no major side effects from the cyclophosphamide.  She has had no other major systemic symptoms at this time.    She has been having problems with fluctuating right knee pain.  It is doing better over the last week.  We talked about it with her over the phone a week ago.  She did have much more swelling and warmth related to this.  She has not had issues with the knees previously.  We talked about the possibility that this could be related to her underlying autoimmune condition.  It is doing better at this time and she does not feel like she needs to do anything more.  She has had no fevers or chills.  She denies any other joint pains at this time.  She denies any fevers or chills.    She is wanting to increase her work after January 14 to daily.  A letter was generated for her today related to this.    We reviewed her kidney disease and her edema and this is stable at this time.  Her torsemide is being adjusted actively.    We reviewed her pneumocystis prophylaxis and she is on dapsone at this time for prophylaxis.    She is traveling to [place] on 16 January and will be gone for 10 days.  She wondered about concerns.  I talked her about concerns about valley fever (coccidiomycosis) and immunosuppression.  She needs to avoid delilah situations in [place].  I rather have her avoid this fully, but she wants to go ahead with that.    We reviewed her other issues noted in the assessment but not specifically addressed in the HPI above.     On review of systems, the patient denies any chest pain or shortness of breath.    ______________________________________________________________________    Exam:    Wt Readings from Last 3 Encounters:   01/03/19 169 lb (76.7 kg)   12/20/18 166 lb (75.3 kg)   12/04/18 168 lb (76.2 kg)     BP Readings from Last 3 Encounters:   01/03/19 138/84   01/03/19 141/87   12/20/18 147/85     /84   Pulse 83   Wt 169 lb (76.7 kg)    LMP 07/25/2016 (Approximate)   SpO2 92%   BMI 29.01 kg/m      The patient is comfortable, no acute distress.  Mood good.  Insight good.  Eyes are nonicteric.  Neck is supple without mass.  No cervical adenopathy.  No thyromegaly. Heart regular rate and rhythm.  Lungs clear to auscultation bilaterally.  Respiratory effort is good.  Abdomen soft and nontender.  No hepatosplenomegaly.  Extremities 2+ edema.  Her right knee shows a minimal effusion.  There is warmth over the right knee.  She walks well without significant limp.  No other synovitis is noted on today's exam.    ______________________________________________________________________    Diagnostics:    Results for orders placed or performed in visit on 01/03/19   Renal Function Profile   Result Value Ref Range    Albumin 2.5 (L) 3.5 - 5.0 g/dL    Calcium 7.7 (L) 8.5 - 10.5 mg/dL    Phosphorus 4.4 2.5 - 4.5 mg/dL    Glucose 115 70 - 125 mg/dL    BUN 89 (H) 8 - 22 mg/dL    Creatinine 3.71 (H) 0.60 - 1.10 mg/dL    Sodium 143 136 - 145 mmol/L    Potassium 3.7 3.5 - 5.0 mmol/L    Chloride 105 98 - 107 mmol/L    CO2 25 22 - 31 mmol/L    Anion Gap, Calculation 13 5 - 18 mmol/L    GFR MDRD Af Amer 16 (L) >60 mL/min/1.73m2    GFR MDRD Non Af Amer 13 (L) >60 mL/min/1.73m2   HM1 (CBC with Diff)   Result Value Ref Range    WBC 12.9 (H) 4.0 - 11.0 thou/uL    RBC 3.07 (L) 3.80 - 5.40 mill/uL    Hemoglobin 8.9 (L) 12.0 - 16.0 g/dL    Hematocrit 29.1 (L) 35.0 - 47.0 %    MCV 95 80 - 100 fL    MCH 29.0 27.0 - 34.0 pg    MCHC 30.6 (L) 32.0 - 36.0 g/dL    RDW 22.0 (H) 11.0 - 14.5 %    Platelets 236 140 - 440 thou/uL    MPV 11.1 8.5 - 12.5 fL    Neutrophils % 86 (H) 50 - 70 %    Lymphocytes % 8 (L) 20 - 40 %    Monocytes % 6 2 - 10 %    Eosinophils % 1 0 - 6 %    Basophils % 0 0 - 2 %    Neutrophils Absolute 10.7 (H) 2.0 - 7.7 thou/uL    Lymphocytes Absolute 1.1 0.8 - 4.4 thou/uL    Monocytes Absolute 0.7 0.0 - 0.9 thou/uL    Eosinophils Absolute 0.1 0.0 - 0.4 thou/uL     Basophils Absolute 0.0 0.0 - 0.2 thou/uL   Manual Differential   Result Value Ref Range    Platelet Estimate Normal Normal    Ovalocytes 1+ (!) Negative    Polychromasia 1+ (!) Negative     ______________________________________________________________________    Assessment:    1. 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 Essential hypertension    3. Acute kidney failure with lesion of tubular necrosis (H)    4. Acute pain of right knee    5. Baker's cyst of knee, right    6. Return to work exam    7. Need for pneumocystis prophylaxis    8. Bilateral lower extremity edema       ______________________________________________________________________     PHQ-2 Total Score: 0 (12/4/2018  2:00 PM)  Depression Follow-up Plan: patient follow-up to return when and if necessary (8/12/2018  4:06 PM)    No Data Recorded  ______________________________________________________________________       Plan:    1. Follow-up with me again in 2 months.  2. Follow-up with Dr. Moreno in February as scheduled.  3. Discuss with Dr. Moreno about potential ways of preventing hemorrhagic cystitis or other complications from Cytoxan.  4. Very happy with the improvement in her renal function.  5. Continue dapsone for PCP prophylaxis.  6. Continue close follow-up with renal.  7. Notify me if she worsens with symptoms like cough, worsening breathing, worsening joint pain, etc.  We can work the patient in quicker.  May use a reserved slot for appointment if needed for follow up.  8. Letter for work done today.         Lauri Wei MD  General Internal Medicine  [facility]     Personal office fax - [phone]   Voice mail - [phone]  E-mail - [email or web address]      Return in about 2 months (around 3/3/2019) for follow up visit.     Future Appointments   Date Time Provider Department Center   3/4/2019  9:40 AM Lauri Wei MD [facility]         ______________________________________________________________________    Relevant ICD-10 codes and order associations:      ICD-10-CM    1. 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 Essential hypertension I10    3. Acute kidney failure with lesion of tubular necrosis (H) N17.0    4. Acute pain of right knee M25.561    5. Baker's cyst of knee, right M71.21    6. Return to work exam Z76.89    7. Need for pneumocystis prophylaxis Z29.8    8. Bilateral lower extremity edema R60.0

## 2021-06-27 NOTE — PROGRESS NOTES
Progress Notes by Lauri Wei MD at 3/4/2019  9:40 AM     Author: Lauri Wei MD Service: -- Author Type: Physician    Filed: 3/4/2019  3:57 PM Encounter Date: 3/4/2019 Status: Signed    : Lauri Wei MD (Physician)              [facility]    Comprehensive and complex medical care - Chronic disease management - Shared decision making - Care coordination - Compassionate care    Patient advocacy - Rational deprescribing - Minimally disruptive medicine - Ethical focus - Customized care    ______________________________________________________________________     Date of Service: 3/4/2019  Primary Provider: Lauri Wei MD    Patient Care Team:  Lauri Wei MD as PCP - General (Internal Medicine)  Benigno Moreno MD as Physician (Nephrology)     ______________________________________________________________________     Patient's Pharmacy:    [id number] - [place] [address] AT [facility] [address]  [address]  [facility] [address]  Phone: [phone] Fax: [phone]    [facility] - [address] [address]  Phone: [phone] Fax: [phone]     Patient's Contacts:  Name Home Phone Work Phone Mobile Phone Relationship Lgl Megacatina   KIARRA ROSALES   [phone] Spouse      Patient's Insurance:    Payor: MEDICA / Plan: MEDICA / Product Type: PPO/POS/FFS /     ______________________________________________________________________     Kayla Rosales is 48 y.o. female who comes in today for:    Chief Complaint   Patient presents with   ? Follow-up     polyangitis, kidney disease, edema is better, right knee pain is good       Active Problem List:  Problem List as of 3/4/2019 Reviewed: 3/4/2019  3:49 PM by Lauri Wei MD       High    Nonsmoker    Granulomatosis with polyangiitis with renal  involvement (H)       Medium    HTN (hypertension)       Low    Allergic rhinitis    GERD (gastroesophageal reflux disease)    HLD (hyperlipidemia)       Unprioritized    Acute kidney failure with lesion of tubular necrosis (H)    Anemia due to stage 4 chronic kidney disease (H)           Current Outpatient Medications   Medication Sig   ? acetaminophen (TYLENOL) 500 MG tablet Take 2 tablets (1,000 mg total) by mouth once as needed for fever (chills, and/or rigors.).   ? amLODIPine (NORVASC) 10 MG tablet Take 1 tablet (10 mg total) by mouth daily.   ? calcitriol (ROCALTROL) 0.25 MCG capsule Take 1 capsule (0.25 mcg total) by mouth daily.   ? calcium acetate (PHOSLO) 667 mg capsule Take 1 capsule (667 mg total) by mouth 3 (three) times a day with meals.   ? carvedilol (COREG) 6.25 MG tablet Take 1 tablet (6.25 mg total) by mouth 2 (two) times a day.   ? dapsone 100 MG tablet Take 1 tablet (100 mg total) by mouth daily.   ? multivitamin therapeutic tablet Take 1 tablet by mouth daily.   ? omeprazole (PRILOSEC) 20 MG capsule Take 1 capsule (20 mg total) by mouth daily.   ? predniSONE (DELTASONE) 20 MG tablet Take 10 mg by mouth daily with breakfast.   ? torsemide (DEMADEX) 20 MG tablet Take 1 tablet (20 mg total) by mouth every other day.   ? azelastine (ASTELIN) 137 mcg (0.1 %) nasal spray Apply 1 spray into each nostril every morning. Use in each nostril as directed   ? cyclophosphamide (CYTOXAN) 50 mg cap capsule Take 2 capsules (100 mg total) by mouth daily.   ? losartan (COZAAR) 25 MG tablet Take 1 tablet (25 mg total) by mouth daily.     Social History     Social History Narrative   ? Not on file     ______________________________________________________________________     History of present illness:    Patient comes in today for follow-up.    We reviewed her polyangiitis.  She is on Cytoxan 100 mg a day.  She continues to follow-up with renal.  She is going to be reducing her prednisone to 10 mg a day soon.   Her edema in her legs is doing well.  She has reduced her torsemide to 10 mg a day.  Her renal function has not improved a whole lot recently.  I do not have lab work to refer to from the nephrology group.    Reviewed her anemia.  She was recently found to be anemic into the upper sixes.  She was started on Epogen and this has been helpful for her.    Reviewed her high blood pressure and her blood pressure is doing better at this time.  She has no major concerns related to this.  Her breathing is good.  She did travel to [place] without issues.  She did have a respiratory illness a few weeks back and this took about a week to go away but she has no residual symptoms.    She has had some tongue thrush in the past and occasionally notes an irregularity to the tongue.  She denies any pain or pain with swallowing.  She would like to have the nystatin in the future if needed for this.  This was reviewed with her.    We reviewed her other issues noted in the assessment but not specifically addressed in the HPI above.     On review of systems, the patient denies any chest pain or shortness of breath.    ______________________________________________________________________    Exam:    Wt Readings from Last 3 Encounters:   03/04/19 150 lb (68 kg)   01/03/19 169 lb (76.7 kg)   12/20/18 166 lb (75.3 kg)     BP Readings from Last 3 Encounters:   03/04/19 136/72   01/03/19 138/84   01/03/19 141/87     /72   Pulse 84   Wt 150 lb (68 kg)   LMP 07/25/2016 (Approximate)   SpO2 94%   BMI 25.75 kg/m     The patient is comfortable, no acute distress.  Mood good.  Insight good.  Eyes are nonicteric.  Neck is supple without mass.  No cervical adenopathy.  No thyromegaly. Heart regular rate and rhythm.  Lungs clear to auscultation bilaterally.  Respiratory effort is good.  Abdomen soft and nontender.  No hepatosplenomegaly.  Extremities no edema.  No rashes nor any signs of eye  disease.    ______________________________________________________________________    Diagnostics:    Results for orders placed or performed in visit on 01/03/19   Renal Function Profile   Result Value Ref Range    Albumin 2.5 (L) 3.5 - 5.0 g/dL    Calcium 7.7 (L) 8.5 - 10.5 mg/dL    Phosphorus 4.4 2.5 - 4.5 mg/dL    Glucose 115 70 - 125 mg/dL    BUN 89 (H) 8 - 22 mg/dL    Creatinine 3.71 (H) 0.60 - 1.10 mg/dL    Sodium 143 136 - 145 mmol/L    Potassium 3.7 3.5 - 5.0 mmol/L    Chloride 105 98 - 107 mmol/L    CO2 25 22 - 31 mmol/L    Anion Gap, Calculation 13 5 - 18 mmol/L    GFR MDRD Af Amer 16 (L) >60 mL/min/1.73m2    GFR MDRD Non Af Amer 13 (L) >60 mL/min/1.73m2   HM1 (CBC with Diff)   Result Value Ref Range    WBC 12.9 (H) 4.0 - 11.0 thou/uL    RBC 3.07 (L) 3.80 - 5.40 mill/uL    Hemoglobin 8.9 (L) 12.0 - 16.0 g/dL    Hematocrit 29.1 (L) 35.0 - 47.0 %    MCV 95 80 - 100 fL    MCH 29.0 27.0 - 34.0 pg    MCHC 30.6 (L) 32.0 - 36.0 g/dL    RDW 22.0 (H) 11.0 - 14.5 %    Platelets 236 140 - 440 thou/uL    MPV 11.1 8.5 - 12.5 fL    Neutrophils % 86 (H) 50 - 70 %    Lymphocytes % 8 (L) 20 - 40 %    Monocytes % 6 2 - 10 %    Eosinophils % 1 0 - 6 %    Basophils % 0 0 - 2 %    Neutrophils Absolute 10.7 (H) 2.0 - 7.7 thou/uL    Lymphocytes Absolute 1.1 0.8 - 4.4 thou/uL    Monocytes Absolute 0.7 0.0 - 0.9 thou/uL    Eosinophils Absolute 0.1 0.0 - 0.4 thou/uL    Basophils Absolute 0.0 0.0 - 0.2 thou/uL   Manual Differential   Result Value Ref Range    Platelet Estimate Normal Normal    Ovalocytes 1+ (!) Negative    Polychromasia 1+ (!) Negative     ______________________________________________________________________    Assessment:    1. 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 Acute kidney injury (H)    3. Essential hypertension    4. Chronic rhinitis    5. Anemia of renal disease    6. Acute kidney failure with lesion of tubular necrosis (H)    7. Mixed hyperlipidemia    8. Gastroesophageal reflux  disease, esophagitis presence not specified    9. Nausea      ______________________________________________________________________     PHQ-2 Total Score: 0 (12/4/2018  2:00 PM)  Depression Follow-up Plan: patient follow-up to return when and if necessary (8/12/2018  4:06 PM)    No Data Recorded  ______________________________________________________________________    Plan:    1. Patient will follow-up with me again in 4 months.  2. She should continue to follow-up with nephrology.  Would appreciate ongoing updates from them related to her care.  3. Med reconciliation done today.  Of note, she is also on Epogen.  4. She might need liquid nystatin swish and spit in the future if the rash comes back.  5. Nausea likely due to Cytoxan.  6. Follow-up every 4 months to make sure were not missing anything and to keep up to speed on her care.  7. Consider Pneumovax and Shingrix in the future.    Lauri Wei MD  General Internal Medicine  [facility]     Return in about 4 months (around 7/4/2019) for follow up visit.     Future Appointments   Date Time Provider Department Center   7/15/2019  9:40 AM Lauri Wei MD [facility]         ______________________________________________________________________     Relevant ICD-10 codes and order associations:      ICD-10-CM    1. Granulomatosis with polyangiitis with renal involvement (H) M31.31 predniSONE (DELTASONE) 20 MG tablet     cyclophosphamide (CYTOXAN) 50 mg cap capsule   2. Acute kidney injury (H) N17.9 calcitriol (ROCALTROL) 0.25 MCG capsule   3. Essential hypertension I10 torsemide (DEMADEX) 20 MG tablet     amLODIPine (NORVASC) 10 MG tablet     losartan (COZAAR) 25 MG tablet   4. Chronic rhinitis J31.0 azelastine (ASTELIN) 137 mcg (0.1 %) nasal spray   5. Anemia of renal disease D63.1    6. Acute kidney failure with lesion of tubular necrosis (H) N17.0    7. Mixed hyperlipidemia E78.2    8. Gastroesophageal reflux disease,  esophagitis presence not specified K21.9    9. Nausea R11.0

## 2021-06-27 NOTE — PROGRESS NOTES
Progress Notes by Lauri Wei MD at 7/15/2019  9:40 AM     Author: Lauri Wei MD Service: -- Author Type: Physician    Filed: 7/16/2019  9:04 PM Encounter Date: 7/15/2019 Status: Signed    : Lauri Wei MD (Physician)              [facility]    Comprehensive and complex medical care - Chronic disease management - Shared decision making - Care coordination - Compassionate care    Patient advocacy - Rational deprescribing - Minimally disruptive medicine - Ethical focus - Customized care    ______________________________________________________________________     Date of Service: 7/15/2019  Primary Provider: Lauri Wei MD    Patient Care Team:  Lauri Wei MD as PCP - General (Internal Medicine)  Benigno Moreno MD as Physician (Nephrology)     ______________________________________________________________________     Patient's Pharmacy:    "[id number] - [place] [address] AT [facility] [address]  [address]  [facility] [address]  Phone: [phone] Fax: [phone]    [facility] - [address] [address]  Phone: [phone] Fax: [phone]     Patient's Contacts:  Name Home Phone Work Phone Mobile Phone Relationship Lgl KIARRA Kendrick   [phone] Spouse      Patient's Insurance:    Payor: MEDICA / Plan: MEDICA / Product Type: PPO/POS/FFS /   ______________________________________________________________________   ______________________________________________________________________     Kayla SHEMAR Rosales is a 49 y.o. female who comes in today for:    Chief Complaint   Patient presents with   ? Follow-up     still a little bit nauseated - seems constant       Active Problem List:  Problem List as of 7/15/2019 Reviewed: 7/15/2019 10:33 PM by Lauri Wei MD High     Nonsmoker    Granulomatosis with polyangiitis with renal involvement (H)       Medium    HTN (hypertension)       Low    Allergic rhinitis    GERD (gastroesophageal reflux disease)    HLD (hyperlipidemia)       Unprioritized    Acute kidney failure with lesion of tubular necrosis (H)    Anemia due to stage 4 chronic kidney disease (H)           Current Outpatient Medications   Medication Sig Note   ? acetaminophen (TYLENOL) 500 MG tablet Take 2 tablets (1,000 mg total) by mouth once as needed for fever (chills, and/or rigors.).    ? amLODIPine (NORVASC) 10 MG tablet Take 1 tablet (10 mg total) by mouth daily.    ? azelastine (ASTELIN) 137 mcg (0.1 %) nasal spray Apply 1 spray into each nostril every morning. Use in each nostril as directed    ? calcitriol (ROCALTROL) 0.25 MCG capsule Take 1 capsule (0.25 mcg total) by mouth daily.    ? calcium acetate (PHOSLO) 667 mg capsule Take 1 capsule (667 mg total) by mouth 3 (three) times a day with meals.    ? carvedilol (COREG) 6.25 MG tablet Take 1 tablet (6.25 mg total) by mouth 2 (two) times a day.    ? cyclophosphamide (CYTOXAN) 25 mg cap capsule Take 100 mg by mouth daily. 7/15/2019: Taking 50mg daily   ? losartan (COZAAR) 50 MG tablet Take 50 mg by mouth daily.    ? multivitamin therapeutic tablet Take 1 tablet by mouth daily.    ? omeprazole (PRILOSEC) 20 MG capsule Take 1 capsule (20 mg total) by mouth daily.    ? predniSONE (DELTASONE) 5 MG tablet Take 5 mg by mouth daily.    ? torsemide (DEMADEX) 20 MG tablet Take 1 tablet (20 mg total) by mouth every other day.      Social History     Social History Narrative     without children.        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     ______________________________________________________________________     History of present illness:    First reviewed her granulomatosis with polyangiitis.  She is on 50 mg of cytoxan ans 5 mg of prednisone.  She is working on this.   There is apparent remission of this as a problem.    Her chronic kidney disease (CKD) is not improving enough so she will be proceeding with transplant evaluation through [facility] ([facility]).  She is in support of this.    She will need to monitor her blood pressure as it has been elevated at times and I have encouraged her to coordinate this with Dr. Moreno.    She had some post-nasal drip and wonders if she can try loratadine for this.    She has some am nausea which may somewhat be related to the post-nasal drip but it could be due to the chronic kidney disease (CKD) as well.    Has some epigastric pain at times     We reviewed her other issues noted in the assessment but not specifically addressed in the HPI above.     On review of systems, the patient denies any chest pain or shortness of breath.    ______________________________________________________________________    Exam:    Wt Readings from Last 3 Encounters:   07/15/19 150 lb (68 kg)   03/04/19 150 lb (68 kg)   01/03/19 169 lb (76.7 kg)     BP Readings from Last 3 Encounters:   07/15/19 136/86   03/04/19 136/72   01/03/19 138/84     /86   Pulse 86   Temp 98.1  F (36.7  C)   Resp 16   Wt 150 lb (68 kg)   LMP 07/25/2016 (Approximate)   SpO2 98%   BMI 25.75 kg/m      The patient is comfortable, no acute distress.  Mood good.  Insight good.  Eyes are nonicteric. Nose shows mild rhinitis. Neck is supple without mass.  No cervical adenopathy.  No thyromegaly. Heart regular rate and rhythm.  Lungs clear to auscultation bilaterally.  Respiratory effort is good.  Abdomen soft and nontender.  No hepatosplenomegaly.  Extremities 1+ ankle edema.      ______________________________________________________________________    Diagnostics:    Results for orders placed or performed in visit on 01/03/19   Renal Function Profile   Result Value Ref Range    Albumin 2.5 (L) 3.5 - 5.0 g/dL    Calcium 7.7 (L) 8.5 - 10.5 mg/dL    Phosphorus 4.4  2.5 - 4.5 mg/dL    Glucose 115 70 - 125 mg/dL    BUN 89 (H) 8 - 22 mg/dL    Creatinine 3.71 (H) 0.60 - 1.10 mg/dL    Sodium 143 136 - 145 mmol/L    Potassium 3.7 3.5 - 5.0 mmol/L    Chloride 105 98 - 107 mmol/L    CO2 25 22 - 31 mmol/L    Anion Gap, Calculation 13 5 - 18 mmol/L    GFR MDRD Af Amer 16 (L) >60 mL/min/1.73m2    GFR MDRD Non Af Amer 13 (L) >60 mL/min/1.73m2   HM1 (CBC with Diff)   Result Value Ref Range    WBC 12.9 (H) 4.0 - 11.0 thou/uL    RBC 3.07 (L) 3.80 - 5.40 mill/uL    Hemoglobin 8.9 (L) 12.0 - 16.0 g/dL    Hematocrit 29.1 (L) 35.0 - 47.0 %    MCV 95 80 - 100 fL    MCH 29.0 27.0 - 34.0 pg    MCHC 30.6 (L) 32.0 - 36.0 g/dL    RDW 22.0 (H) 11.0 - 14.5 %    Platelets 236 140 - 440 thou/uL    MPV 11.1 8.5 - 12.5 fL    Neutrophils % 86 (H) 50 - 70 %    Lymphocytes % 8 (L) 20 - 40 %    Monocytes % 6 2 - 10 %    Eosinophils % 1 0 - 6 %    Basophils % 0 0 - 2 %    Neutrophils Absolute 10.7 (H) 2.0 - 7.7 thou/uL    Lymphocytes Absolute 1.1 0.8 - 4.4 thou/uL    Monocytes Absolute 0.7 0.0 - 0.9 thou/uL    Eosinophils Absolute 0.1 0.0 - 0.4 thou/uL    Basophils Absolute 0.0 0.0 - 0.2 thou/uL   Manual Differential   Result Value Ref Range    Platelet Estimate Normal Normal    Ovalocytes 1+ (!) Negative    Polychromasia 1+ (!) Negative     CT ABDOMEN PELVIS WO ORAL WO IV CONTRAST  11/7/2018 1:37 PM     INDICATION: Abdominal pain and diarrhea  TECHNIQUE: Routine CT abdomen and pelvis without oral or IV contrast. Multiplanar reformation images (MPR). Dose reduction techniques were used.  COMPARISON: None.     FINDINGS:  LUNG BASES: Negative.     ABDOMEN: Nonspecific ileitis involving the terminal 30 cm of the ileum poorly characterized without use of IV contrast. Nothing for abscesses. Consider infectious ileitis versus inflammatory bowel disease. Normal appendix.     No significant findings in the liver, spleen, kidneys, pancreas, or adrenal glands. No abscesses.     PELVIS: Negative.     MUSCULOSKELETAL:  Negative.     IMPRESSION:   CONCLUSION:  1.  Nonspecific ileitis involving the terminal 30 cm of the ileum poorly characterized without use of IV contrast. Differential would include infectious enteritis versus Crohn's disease.     2.  No obstructions, or abscesses.     ______________________________________________________________________    Assessment:    1. 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 CKD (chronic kidney disease) stage 5, GFR less than 15 ml/min (H)    3. Essential hypertension    4. Anemia of renal disease    5. Nausea    6. Post-nasal drip    7. Encounter for completion of form with patient    8. Pre-transplant evaluation for CKD (chronic kidney disease)       ______________________________________________________________________     PHQ-2 Total Score: 0 (7/16/2019  8:00 PM)  Depression Follow-up Plan: patient follow-up to return when and if necessary (8/12/2018  4:06 PM)    No data recorded  ______________________________________________________________________     BMI Readings from Last 1 Encounters:   07/15/19 25.75 kg/m      ______________________________________________________________________       Plan:    1. Follow up with [facility]) transplant center as scheduled.  2. Follow up with nephrology as scheduled.  3. See me every 6 months.  4. FMLA form done for work today.  5. Patient will try claritin for post-nasal drip.  6. Nausea partly likely due to renal disease.  7. Has evidence of possible small bowel disease in the past.  EGD and CT scan otherwise negative.  Could consider MR enterography without contrast, SBFT or pill endoscopy in the future.  8. FMLA form done today for work and copy in chart.         Lauri Wei MD  General Internal Medicine  [facility]     Personal office fax - [phone]   Voice mail - [phone]  E-mail - [email or web address]      Return in about 6 months (around 1/15/2020) for  follow up visit.     No future appointments.     ______________________________________________________________________    Relevant ICD-10 codes and order associations:      ICD-10-CM    1. 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 CKD (chronic kidney disease) stage 5, GFR less than 15 ml/min (H) N18.5    3. Essential hypertension I10    4. Anemia of renal disease N18.9     D63.1    5. Nausea R11.0    6. Post-nasal drip R09.82    7. Encounter for completion of form with patient Z02.89    8. Pre-transplant evaluation for CKD (chronic kidney disease) Z01.582

## 2021-07-14 PROBLEM — N17.9 ACUTE KIDNEY INJURY (H): Status: RESOLVED | Noted: 2018-11-10 | Resolved: 2018-12-06

## 2021-07-14 PROBLEM — K92.1 MELENA: Status: RESOLVED | Noted: 2018-11-07 | Resolved: 2019-03-04

## 2021-07-14 PROBLEM — K92.2 GI BLEED: Status: RESOLVED | Noted: 2018-11-07 | Resolved: 2018-12-06

## 2021-07-21 ENCOUNTER — RECORDS - HEALTHEAST (OUTPATIENT)
Dept: ADMINISTRATIVE | Facility: CLINIC | Age: 51
End: 2021-07-21

## 2021-07-22 ENCOUNTER — RECORDS - HEALTHEAST (OUTPATIENT)
Dept: FAMILY MEDICINE | Facility: CLINIC | Age: 51
End: 2021-07-22

## 2021-07-22 DIAGNOSIS — Z12.31 OTHER SCREENING MAMMOGRAM: ICD-10-CM

## 2021-08-03 PROBLEM — I77.82 ANCA-ASSOCIATED VASCULITIS (H): Status: RESOLVED | Noted: 2018-11-19 | Resolved: 2019-03-04

## 2021-10-17 ENCOUNTER — HEALTH MAINTENANCE LETTER (OUTPATIENT)
Age: 51
End: 2021-10-17

## 2022-07-23 ENCOUNTER — HEALTH MAINTENANCE LETTER (OUTPATIENT)
Age: 52
End: 2022-07-23

## 2022-10-01 ENCOUNTER — HEALTH MAINTENANCE LETTER (OUTPATIENT)
Age: 52
End: 2022-10-01

## 2023-08-06 ENCOUNTER — HEALTH MAINTENANCE LETTER (OUTPATIENT)
Age: 53
End: 2023-08-06

## 2023-12-24 ENCOUNTER — HEALTH MAINTENANCE LETTER (OUTPATIENT)
Age: 53
End: 2023-12-24